# Patient Record
Sex: FEMALE | Race: WHITE | NOT HISPANIC OR LATINO
[De-identification: names, ages, dates, MRNs, and addresses within clinical notes are randomized per-mention and may not be internally consistent; named-entity substitution may affect disease eponyms.]

---

## 2022-05-20 ENCOUNTER — NON-APPOINTMENT (OUTPATIENT)
Age: 72
End: 2022-05-20

## 2022-09-07 PROBLEM — Z00.00 ENCOUNTER FOR PREVENTIVE HEALTH EXAMINATION: Status: ACTIVE | Noted: 2022-09-07

## 2022-09-13 ENCOUNTER — APPOINTMENT (OUTPATIENT)
Dept: PULMONOLOGY | Facility: CLINIC | Age: 72
End: 2022-09-13

## 2022-09-13 VITALS
HEART RATE: 88 BPM | SYSTOLIC BLOOD PRESSURE: 90 MMHG | OXYGEN SATURATION: 96 % | TEMPERATURE: 97.7 F | HEIGHT: 62 IN | BODY MASS INDEX: 23.55 KG/M2 | DIASTOLIC BLOOD PRESSURE: 64 MMHG | WEIGHT: 128 LBS

## 2022-09-13 DIAGNOSIS — Z82.0 FAMILY HISTORY OF EPILEPSY AND OTHER DISEASES OF THE NERVOUS SYSTEM: ICD-10-CM

## 2022-09-13 PROCEDURE — 99204 OFFICE O/P NEW MOD 45 MIN: CPT | Mod: 25

## 2022-09-13 NOTE — PHYSICAL EXAM
[No Acute Distress] : no acute distress [Normal Appearance] : normal appearance [Normal S1, S2] : normal s1, s2 [Clear to Auscultation Bilaterally] : clear to auscultation bilaterally [Benign] : benign [Oriented x3] : oriented x3

## 2022-09-13 NOTE — ASSESSMENT
[FreeTextEntry1] : 71 year old female recently diagnosed ALS presents for evaluation increased wheeze difficulty swallowing, suspect chronic aspiration\par \par Speech and Swallow evaluation\par Nebulized Budesonide and albuterol/Ipratropium twice daily\par PFT with NIKKO testing\par Follow up Neurologyt\par Follow up GI\par  \par Follow up with PFT/NIKKO testing

## 2022-09-13 NOTE — HISTORY OF PRESENT ILLNESS
[Never] : never [TextBox_4] : Patient is a 71 year old female Hx recently diagnosed ALS, dysphagia, chronic aspiration [facility].  Patient has been experiencing increased wheeze, choking with thin liquids.  She is following with Neurology, and is anticipating GI evaluation.  She reports she chokes with water and many foods. She is nonverbal at baseline. She was following in [place] and now lives here in [place] with her son.  She reports cough and shortness of breath at times.  She is here for these symptoms accompanied by her caretaker.

## 2022-09-13 NOTE — REVIEW OF SYSTEMS
[Recent Wt Loss (___ Lbs)] : ~T recent [unfilled] lb weight loss [Cough] : cough [Wheezing] : wheezing [Dysphagia] : dysphagia [Negative] : Endocrine [TextBox_122] : See HPI

## 2022-09-22 ENCOUNTER — APPOINTMENT (OUTPATIENT)
Dept: OTOLARYNGOLOGY | Facility: CLINIC | Age: 72
End: 2022-09-22

## 2022-09-22 PROCEDURE — 92610 EVALUATE SWALLOWING FUNCTION: CPT | Mod: GN

## 2022-09-25 NOTE — ASSESSMENT
[FreeTextEntry1] : CLINICAL DYSPHAGIA EVALUATION\par \par Date of Evaluation: 2022\par \par Patient Name: Sydney Barrett\par Patient : 1950\par Primary Diagnosis: Amyotrophic lateral sclerosis (G12.21)  ; Aspiration, chronic pulmonary (T17.908A)\par Treatment Diagnosis: Oropharyngeal dysphagia (R13.12)\par Referring Physician: Bianca Horn\par Date of Onset: 2021\par \par REASON FOR REFERRAL: Sydney Barrett was seen for an initial Clinical Dysphagia Evaluation at the [facility] ENT office upon the referral of her pulmonologist, Dr. Bianca Horn, due to a diagnosis of ALS, dysphagia and chronic aspiration. This test was ordered to assess oral and pharyngeal stages of swallow function, determine patient safety / tolerance for and oral diet, and/or determine candidacy for further objective swallow testing as needed. \par \par HISTORY OF PRESENTING ILLNESS:  Sydney Barrett was alert and cooperative upon arrival to today's evaluation. She was accompanied by her home health aide. As a result of her dysarthric speech, patient relied on use of a "boogie board" to express herself. Patient reports being diagnosed with ALS in 2021. She reports owning a augmentative and alternative communication device with eye gaze which she uses daily but did not bring to today's evaluation. Every couple of months, the patient follows up with a physician team at [facility] in [place]. Currently, she lives in [place] with her son. Patient denies ever having a swallow evaluation. She is consuming primarily pureed foods and using "Thick-it", according to the HHA. She has been choking. Patient reports not wanting a feeding tube. Patient denies difficulty with self-feeding. \par \par Current Nutritional Intake: Pureed foods and using "Thick-it", as reported by patient's HAA.\par \par Current Respiratory Status: Room air\par \par MEDICAL HISTORY: According to medical charting, as well as documentation provided by patient, PMHx is significant for Amyotrophic lateral sclerosis (ALS), Chronic pulmonary aspiration, Oropharyngeal dysphagia, Hypertension. Medication intake includes Radicava, Riluzole, Hydrochlorothiazide, Lisinopril and Multi VItamin. Patient is also taking medication for oral thrush at the present time. Allergies include Penicillin. \par \par CLINICAL FINDINGS:\par Oroperipheral Examination\par Structures: WFL. +white film on tongue consistent with oral thrush\par Symmetry: WFL\par Secretion Management: Mild pooling of secretions in oral cavity. Intermittent wet vocal quality.\par Mandibular Function: WFL\par Soft Palate: Reduced elevation\par Labial Function: Reduced strength and ROM noted upon pursing and retraction\par Lingual Function: Reduced strength and ROM upon protrusion, elevation and lateralization. Tongue lateralization is especially reduced on R. +fasciculations at rest\par Volitional Cough: Weak\par Volitional Swallow: Delayed, effortful\par \par Motor Speech / Voice: Patient demonstrated slow, labored speech accompanied by impaired articulatory precision, reduced projection and monotone quality. Overall speech intelligibility is judged to be moderately to severely reduced at the single word level.  In addition, wet vocal quality accompanied by weak  throat clearing is intermittently noted throughout today's evaluation.\par \par Consistencies Administered: Pureed; Moderately-Thick Liquid; Mildly-Thick Liquid; Thin Liquids\par \par Oral Phase: The patient demonstrated a moderate to sever oral  dysphagia characterized by weak labial stripping of bolus from utensil, impaired lingual movement with decreased bolus cohesion and prolonged anterior-posterior transport, and suspected premature spillage over base of tongue for pureed, moderately-thick, mildly-thick and thin liquids.\par \par Pharyngeal Stage: The patient demonstrated a moderate to severe pharyngeal dysphagia characterized by delayed swallow trigger with reduced hyolaryngeal elevation/excursion upon digital palpation. There was inconsistent changes in vocal quality to "wet" tone and weak throat clear post deglutition across all textures, suggestive of laryngeal penetration versus aspiration. Increased work of breathing was exhibited post single trial of thin liquid via small cup sip, further suggestive of compromised airway protection. Respiratory pattern was noted to recover after clinician cueing in diaphragmatic breathing. \par \par PRELIMINARY IMPRESSIONS: Moderate to Severe Oropharyngeal Dysphagia\par \par RECOMMENDATIONS:\par 1) Consider Non-Oral Means of Nutrition/Hydration/Medication due to the severity of patient's dysphagia state, as described above.\par 2) Should patient wish to continue oral feeds with the understanding of risks involved (i.e. aspiration, pneumonia, choking), allow Pureed Diet Textures and Moderately-Thick Liquids via Teaspoon Only and with strict adherence to upright position (90 degree angle), slow rate of feeding, small bolus amounts, NO straws.\par 3) Maintain Aspiration Precautions\par 4) A short course of Swallow Therapy (CPT - 63715) is recommended 1x per week, for 4-6 weeks, to maximize swallow function\par 5) Follow up with referring MD for GOC discussion\par \par EDUCATION: The above recommendations were discussed at length with the patient and her health aide. Verbal and written educational information were also provided re: aspiration precautions and safe swallow guidelines. At this time, patient expresses adamant wishes about not wanting a feeding tube. She was advised to follow up with referring MD to discuss GOC. \par \par Should you have additional questions/concerns, please contact this office at [phone].\par \par Jailene Johansen M.S., CCC-SLP

## 2022-09-26 ENCOUNTER — NON-APPOINTMENT (OUTPATIENT)
Age: 72
End: 2022-09-26

## 2022-10-13 ENCOUNTER — APPOINTMENT (OUTPATIENT)
Dept: PULMONOLOGY | Facility: CLINIC | Age: 72
End: 2022-10-13

## 2022-10-13 ENCOUNTER — APPOINTMENT (OUTPATIENT)
Dept: INTERNAL MEDICINE | Facility: CLINIC | Age: 72
End: 2022-10-13

## 2022-10-13 VITALS
WEIGHT: 124 LBS | HEIGHT: 62 IN | TEMPERATURE: 97.6 F | OXYGEN SATURATION: 99 % | HEART RATE: 85 BPM | BODY MASS INDEX: 22.82 KG/M2 | SYSTOLIC BLOOD PRESSURE: 120 MMHG | DIASTOLIC BLOOD PRESSURE: 78 MMHG

## 2022-10-13 PROCEDURE — 94726 PLETHYSMOGRAPHY LUNG VOLUMES: CPT

## 2022-10-13 PROCEDURE — 94010 BREATHING CAPACITY TEST: CPT

## 2022-10-13 PROCEDURE — 99214 OFFICE O/P EST MOD 30 MIN: CPT | Mod: 25

## 2022-10-13 PROCEDURE — 94729 DIFFUSING CAPACITY: CPT

## 2022-10-13 NOTE — PROCEDURE
[FreeTextEntry1] : PFT 10/13/22 personally reviewed normal spirometry, normal lung volumes, normal DLCO significant decrease in PI PE max indicating severe respiratory muscle weakness.

## 2022-10-13 NOTE — ASSESSMENT
[FreeTextEntry1] : 71 year old female recently diagnosed ALS presents for follow up newly diagnosed severe oropharyngeal dysphagia \par \par Continue nebulized Budesonide and albuterol/Ipratropium twice daily\par Follow up Neurology\par Follow up GI\par  \par Discussed at length possibility of respiratory support in future.  Discussed noninvasive respiratory support, risks and contraindications possibility of tracheostomy.  Patient expressed understanding.

## 2022-10-13 NOTE — HISTORY OF PRESENT ILLNESS
[Never] : never [TextBox_4] : Patient is a 71 year old female Hx recently diagnosed ALS, dysphagia, chronic aspiration presents to [facility] ER for follow up.  Patient had swallow evaluation revealing severe severe oral dysphagia and recommended non oral means of nutrition.  Patient  is not amenable to this.  She is now using thick it and eating pureed foods.  She feels she is breathing better. She follow at [facility] for ALS.  She has not undergone sleep evaluation and noninvasive ventilation may be contraindicated in setting of severe dysphagia.  She is here for follow up accompanied by family member.

## 2022-10-24 ENCOUNTER — APPOINTMENT (OUTPATIENT)
Dept: OTOLARYNGOLOGY | Facility: CLINIC | Age: 72
End: 2022-10-24

## 2022-10-24 PROCEDURE — 92526 ORAL FUNCTION THERAPY: CPT | Mod: KX,GN

## 2022-11-16 ENCOUNTER — APPOINTMENT (OUTPATIENT)
Dept: OTOLARYNGOLOGY | Facility: CLINIC | Age: 72
End: 2022-11-16

## 2022-11-16 PROCEDURE — 92526 ORAL FUNCTION THERAPY: CPT | Mod: GN

## 2022-12-19 ENCOUNTER — NON-APPOINTMENT (OUTPATIENT)
Age: 72
End: 2022-12-19

## 2022-12-27 ENCOUNTER — TRANSCRIPTION ENCOUNTER (OUTPATIENT)
Age: 72
End: 2022-12-27

## 2022-12-28 ENCOUNTER — TRANSCRIPTION ENCOUNTER (OUTPATIENT)
Age: 72
End: 2022-12-28

## 2022-12-28 ENCOUNTER — INPATIENT (INPATIENT)
Facility: HOSPITAL | Age: 72
LOS: 1 days | Discharge: NURSING, REHAB OR LONG-TERM CARE | DRG: 481 | End: 2022-12-30
Attending: INTERNAL MEDICINE | Admitting: STUDENT IN AN ORGANIZED HEALTH CARE EDUCATION/TRAINING PROGRAM
Payer: MEDICARE

## 2022-12-28 VITALS
DIASTOLIC BLOOD PRESSURE: 63 MMHG | SYSTOLIC BLOOD PRESSURE: 101 MMHG | HEIGHT: 62 IN | OXYGEN SATURATION: 98 % | WEIGHT: 119.93 LBS | RESPIRATION RATE: 16 BRPM | TEMPERATURE: 98 F | HEART RATE: 95 BPM

## 2022-12-28 DIAGNOSIS — S72.009A FRACTURE OF UNSPECIFIED PART OF NECK OF UNSPECIFIED FEMUR, INITIAL ENCOUNTER FOR CLOSED FRACTURE: ICD-10-CM

## 2022-12-28 LAB
ALBUMIN SERPL ELPH-MCNC: 3.9 G/DL — SIGNIFICANT CHANGE UP (ref 3.3–5)
ALP SERPL-CCNC: 73 U/L — SIGNIFICANT CHANGE UP (ref 40–120)
ALT FLD-CCNC: 44 U/L — SIGNIFICANT CHANGE UP (ref 10–45)
ANION GAP SERPL CALC-SCNC: 7 MMOL/L — SIGNIFICANT CHANGE UP (ref 5–17)
APTT BLD: 31.6 SEC — SIGNIFICANT CHANGE UP (ref 27.5–35.5)
AST SERPL-CCNC: 38 U/L — SIGNIFICANT CHANGE UP (ref 10–40)
BASOPHILS # BLD AUTO: 0.02 K/UL — SIGNIFICANT CHANGE UP (ref 0–0.2)
BASOPHILS NFR BLD AUTO: 0.5 % — SIGNIFICANT CHANGE UP (ref 0–2)
BILIRUB SERPL-MCNC: 0.6 MG/DL — SIGNIFICANT CHANGE UP (ref 0.2–1.2)
BLD GP AB SCN SERPL QL: SIGNIFICANT CHANGE UP
BUN SERPL-MCNC: 12 MG/DL — SIGNIFICANT CHANGE UP (ref 7–23)
CALCIUM SERPL-MCNC: 8.7 MG/DL — SIGNIFICANT CHANGE UP (ref 8.4–10.5)
CHLORIDE SERPL-SCNC: 104 MMOL/L — SIGNIFICANT CHANGE UP (ref 96–108)
CO2 SERPL-SCNC: 29 MMOL/L — SIGNIFICANT CHANGE UP (ref 22–31)
CREAT SERPL-MCNC: 0.61 MG/DL — SIGNIFICANT CHANGE UP (ref 0.5–1.3)
EGFR: 96 ML/MIN/1.73M2 — SIGNIFICANT CHANGE UP
EOSINOPHIL # BLD AUTO: 0.05 K/UL — SIGNIFICANT CHANGE UP (ref 0–0.5)
EOSINOPHIL NFR BLD AUTO: 1.2 % — SIGNIFICANT CHANGE UP (ref 0–6)
FLUAV AG NPH QL: SIGNIFICANT CHANGE UP
FLUBV AG NPH QL: SIGNIFICANT CHANGE UP
GLUCOSE SERPL-MCNC: 99 MG/DL — SIGNIFICANT CHANGE UP (ref 70–99)
HCT VFR BLD CALC: 33 % — LOW (ref 34.5–45)
HGB BLD-MCNC: 10.8 G/DL — LOW (ref 11.5–15.5)
IMM GRANULOCYTES NFR BLD AUTO: 0.2 % — SIGNIFICANT CHANGE UP (ref 0–0.9)
INR BLD: 1.09 RATIO — SIGNIFICANT CHANGE UP (ref 0.88–1.16)
LYMPHOCYTES # BLD AUTO: 1.49 K/UL — SIGNIFICANT CHANGE UP (ref 1–3.3)
LYMPHOCYTES # BLD AUTO: 34.8 % — SIGNIFICANT CHANGE UP (ref 13–44)
MCHC RBC-ENTMCNC: 29.8 PG — SIGNIFICANT CHANGE UP (ref 27–34)
MCHC RBC-ENTMCNC: 32.7 GM/DL — SIGNIFICANT CHANGE UP (ref 32–36)
MCV RBC AUTO: 91.2 FL — SIGNIFICANT CHANGE UP (ref 80–100)
MONOCYTES # BLD AUTO: 0.35 K/UL — SIGNIFICANT CHANGE UP (ref 0–0.9)
MONOCYTES NFR BLD AUTO: 8.2 % — SIGNIFICANT CHANGE UP (ref 2–14)
NEUTROPHILS # BLD AUTO: 2.36 K/UL — SIGNIFICANT CHANGE UP (ref 1.8–7.4)
NEUTROPHILS NFR BLD AUTO: 55.1 % — SIGNIFICANT CHANGE UP (ref 43–77)
NRBC # BLD: 0 /100 WBCS — SIGNIFICANT CHANGE UP (ref 0–0)
PLATELET # BLD AUTO: 179 K/UL — SIGNIFICANT CHANGE UP (ref 150–400)
POTASSIUM SERPL-MCNC: 3.8 MMOL/L — SIGNIFICANT CHANGE UP (ref 3.5–5.3)
POTASSIUM SERPL-SCNC: 3.8 MMOL/L — SIGNIFICANT CHANGE UP (ref 3.5–5.3)
PROT SERPL-MCNC: 6.6 G/DL — SIGNIFICANT CHANGE UP (ref 6–8.3)
PROTHROM AB SERPL-ACNC: 12.6 SEC — SIGNIFICANT CHANGE UP (ref 10.5–13.4)
RBC # BLD: 3.62 M/UL — LOW (ref 3.8–5.2)
RBC # FLD: 13.9 % — SIGNIFICANT CHANGE UP (ref 10.3–14.5)
RSV RNA NPH QL NAA+NON-PROBE: SIGNIFICANT CHANGE UP
SARS-COV-2 RNA SPEC QL NAA+PROBE: SIGNIFICANT CHANGE UP
SODIUM SERPL-SCNC: 140 MMOL/L — SIGNIFICANT CHANGE UP (ref 135–145)
TROPONIN I, HIGH SENSITIVITY RESULT: 6.4 NG/L — SIGNIFICANT CHANGE UP
WBC # BLD: 4.28 K/UL — SIGNIFICANT CHANGE UP (ref 3.8–10.5)
WBC # FLD AUTO: 4.28 K/UL — SIGNIFICANT CHANGE UP (ref 3.8–10.5)

## 2022-12-28 PROCEDURE — 99221 1ST HOSP IP/OBS SF/LOW 40: CPT | Mod: 57

## 2022-12-28 PROCEDURE — 76376 3D RENDER W/INTRP POSTPROCES: CPT | Mod: 26

## 2022-12-28 PROCEDURE — 27235 TREAT THIGH FRACTURE: CPT | Mod: LT

## 2022-12-28 PROCEDURE — 99285 EMERGENCY DEPT VISIT HI MDM: CPT

## 2022-12-28 PROCEDURE — 72192 CT PELVIS W/O DYE: CPT | Mod: 26,MA

## 2022-12-28 PROCEDURE — 93010 ELECTROCARDIOGRAM REPORT: CPT

## 2022-12-28 PROCEDURE — 99223 1ST HOSP IP/OBS HIGH 75: CPT

## 2022-12-28 PROCEDURE — 73552 X-RAY EXAM OF FEMUR 2/>: CPT | Mod: 26,LT

## 2022-12-28 PROCEDURE — ZZZZZ: CPT

## 2022-12-28 PROCEDURE — 73502 X-RAY EXAM HIP UNI 2-3 VIEWS: CPT | Mod: 26,LT

## 2022-12-28 PROCEDURE — 73562 X-RAY EXAM OF KNEE 3: CPT | Mod: 26,LT

## 2022-12-28 PROCEDURE — 71045 X-RAY EXAM CHEST 1 VIEW: CPT | Mod: 26

## 2022-12-28 DEVICE — IMPLANTABLE DEVICE: Type: IMPLANTABLE DEVICE | Site: LEFT | Status: FUNCTIONAL

## 2022-12-28 DEVICE — SCREW CANN 16MM THREAD 7.3X85MM: Type: IMPLANTABLE DEVICE | Site: LEFT | Status: FUNCTIONAL

## 2022-12-28 DEVICE — GWIRE THRD TRC PT 2.8X300MM: Type: IMPLANTABLE DEVICE | Site: LEFT | Status: FUNCTIONAL

## 2022-12-28 DEVICE — SCREW CANN 16MM THREAD 6.5X85MM: Type: IMPLANTABLE DEVICE | Site: LEFT | Status: FUNCTIONAL

## 2022-12-28 DEVICE — SCREW CANN 16MM THREAD 6.5X90MM: Type: IMPLANTABLE DEVICE | Site: LEFT | Status: FUNCTIONAL

## 2022-12-28 RX ORDER — RILUZOLE 50 MG/1
1 TABLET ORAL
Qty: 0 | Refills: 0 | DISCHARGE

## 2022-12-28 RX ORDER — VANCOMYCIN HCL 1 G
750 VIAL (EA) INTRAVENOUS ONCE
Refills: 0 | Status: COMPLETED | OUTPATIENT
Start: 2022-12-29 | End: 2022-12-29

## 2022-12-28 RX ORDER — SODIUM CHLORIDE 9 MG/ML
1000 INJECTION INTRAMUSCULAR; INTRAVENOUS; SUBCUTANEOUS
Refills: 0 | Status: DISCONTINUED | OUTPATIENT
Start: 2022-12-28 | End: 2022-12-30

## 2022-12-28 RX ORDER — MORPHINE SULFATE 50 MG/1
2 CAPSULE, EXTENDED RELEASE ORAL EVERY 4 HOURS
Refills: 0 | Status: DISCONTINUED | OUTPATIENT
Start: 2022-12-28 | End: 2022-12-28

## 2022-12-28 RX ORDER — SODIUM CHLORIDE 9 MG/ML
500 INJECTION INTRAMUSCULAR; INTRAVENOUS; SUBCUTANEOUS ONCE
Refills: 0 | Status: DISCONTINUED | OUTPATIENT
Start: 2022-12-28 | End: 2022-12-28

## 2022-12-28 RX ORDER — RILUZOLE 50 MG/1
50 TABLET ORAL
Refills: 0 | Status: DISCONTINUED | OUTPATIENT
Start: 2022-12-28 | End: 2022-12-30

## 2022-12-28 RX ORDER — OXYCODONE HYDROCHLORIDE 5 MG/1
10 TABLET ORAL
Refills: 0 | Status: DISCONTINUED | OUTPATIENT
Start: 2022-12-28 | End: 2022-12-30

## 2022-12-28 RX ORDER — LANOLIN ALCOHOL/MO/W.PET/CERES
3 CREAM (GRAM) TOPICAL AT BEDTIME
Refills: 0 | Status: DISCONTINUED | OUTPATIENT
Start: 2022-12-28 | End: 2022-12-28

## 2022-12-28 RX ORDER — LANOLIN ALCOHOL/MO/W.PET/CERES
3 CREAM (GRAM) TOPICAL AT BEDTIME
Refills: 0 | Status: DISCONTINUED | OUTPATIENT
Start: 2022-12-28 | End: 2022-12-30

## 2022-12-28 RX ORDER — HYDROMORPHONE HYDROCHLORIDE 2 MG/ML
0.5 INJECTION INTRAMUSCULAR; INTRAVENOUS; SUBCUTANEOUS
Refills: 0 | Status: DISCONTINUED | OUTPATIENT
Start: 2022-12-28 | End: 2022-12-30

## 2022-12-28 RX ORDER — HYDROMORPHONE HYDROCHLORIDE 2 MG/ML
1 INJECTION INTRAMUSCULAR; INTRAVENOUS; SUBCUTANEOUS
Refills: 0 | Status: DISCONTINUED | OUTPATIENT
Start: 2022-12-28 | End: 2022-12-28

## 2022-12-28 RX ORDER — ACETAMINOPHEN 500 MG
650 TABLET ORAL EVERY 6 HOURS
Refills: 0 | Status: DISCONTINUED | OUTPATIENT
Start: 2022-12-28 | End: 2022-12-28

## 2022-12-28 RX ORDER — SODIUM CHLORIDE 9 MG/ML
1000 INJECTION INTRAMUSCULAR; INTRAVENOUS; SUBCUTANEOUS ONCE
Refills: 0 | Status: COMPLETED | OUTPATIENT
Start: 2022-12-28 | End: 2022-12-28

## 2022-12-28 RX ORDER — ONDANSETRON 8 MG/1
4 TABLET, FILM COATED ORAL ONCE
Refills: 0 | Status: DISCONTINUED | OUTPATIENT
Start: 2022-12-28 | End: 2022-12-28

## 2022-12-28 RX ORDER — EDARAVONE 105 MG/5ML
0 KIT ORAL
Qty: 0 | Refills: 0 | DISCHARGE

## 2022-12-28 RX ORDER — HYDROMORPHONE HYDROCHLORIDE 2 MG/ML
0.5 INJECTION INTRAMUSCULAR; INTRAVENOUS; SUBCUTANEOUS
Refills: 0 | Status: DISCONTINUED | OUTPATIENT
Start: 2022-12-28 | End: 2022-12-28

## 2022-12-28 RX ORDER — RILUZOLE 50 MG/1
0 TABLET ORAL
Qty: 0 | Refills: 0 | DISCHARGE

## 2022-12-28 RX ORDER — SODIUM CHLORIDE 9 MG/ML
1000 INJECTION, SOLUTION INTRAVENOUS
Refills: 0 | Status: DISCONTINUED | OUTPATIENT
Start: 2022-12-28 | End: 2022-12-28

## 2022-12-28 RX ORDER — MORPHINE SULFATE 50 MG/1
4 CAPSULE, EXTENDED RELEASE ORAL ONCE
Refills: 0 | Status: DISCONTINUED | OUTPATIENT
Start: 2022-12-28 | End: 2022-12-28

## 2022-12-28 RX ORDER — OXYCODONE HYDROCHLORIDE 5 MG/1
5 TABLET ORAL
Refills: 0 | Status: DISCONTINUED | OUTPATIENT
Start: 2022-12-28 | End: 2022-12-30

## 2022-12-28 RX ORDER — ONDANSETRON 8 MG/1
4 TABLET, FILM COATED ORAL EVERY 8 HOURS
Refills: 0 | Status: DISCONTINUED | OUTPATIENT
Start: 2022-12-28 | End: 2022-12-30

## 2022-12-28 RX ORDER — ENOXAPARIN SODIUM 100 MG/ML
40 INJECTION SUBCUTANEOUS EVERY 24 HOURS
Refills: 0 | Status: DISCONTINUED | OUTPATIENT
Start: 2022-12-29 | End: 2022-12-30

## 2022-12-28 RX ORDER — ACETAMINOPHEN 500 MG
650 TABLET ORAL EVERY 6 HOURS
Refills: 0 | Status: DISCONTINUED | OUTPATIENT
Start: 2022-12-28 | End: 2022-12-30

## 2022-12-28 RX ORDER — MORPHINE SULFATE 50 MG/1
1 CAPSULE, EXTENDED RELEASE ORAL EVERY 4 HOURS
Refills: 0 | Status: DISCONTINUED | OUTPATIENT
Start: 2022-12-28 | End: 2022-12-28

## 2022-12-28 RX ADMIN — RILUZOLE 50 MILLIGRAM(S): 50 TABLET ORAL at 23:15

## 2022-12-28 RX ADMIN — SODIUM CHLORIDE 1000 MILLILITER(S): 9 INJECTION INTRAMUSCULAR; INTRAVENOUS; SUBCUTANEOUS at 11:48

## 2022-12-28 RX ADMIN — MORPHINE SULFATE 4 MILLIGRAM(S): 50 CAPSULE, EXTENDED RELEASE ORAL at 11:48

## 2022-12-28 RX ADMIN — SODIUM CHLORIDE 75 MILLILITER(S): 9 INJECTION INTRAMUSCULAR; INTRAVENOUS; SUBCUTANEOUS at 23:19

## 2022-12-28 RX ADMIN — MORPHINE SULFATE 4 MILLIGRAM(S): 50 CAPSULE, EXTENDED RELEASE ORAL at 12:18

## 2022-12-28 RX ADMIN — SODIUM CHLORIDE 1000 MILLILITER(S): 9 INJECTION INTRAMUSCULAR; INTRAVENOUS; SUBCUTANEOUS at 12:48

## 2022-12-28 NOTE — ED ADULT NURSE NOTE - CHIEF COMPLAINT QUOTE
Mom fell yesterday at around 530 pm and has pain in her hip and thigh. Unable to weight bear after Fall. History of ALS. done/Annika

## 2022-12-28 NOTE — ED PROVIDER NOTE - PHYSICAL EXAMINATION
VITAL SIGNS: I have reviewed nursing notes and confirm.  CONSTITUTIONAL: well-appearing, non-toxic, NAD  SKIN: Warm dry, normal skin turgor  HEAD: NCAT  EYES:  no scleral icterus  ENT: Moist mucous membranes, normal pharynx   NECK: Supple; non tender. Full ROM.   CARD: RRR, no murmurs, rubs or gallops  RESP: clear to ausculation b/l.  No rales, rhonchi, or wheezing.  ABD: soft, + BS, non-tender, non-distended, no rebound or guarding. No CVA tenderness  EXT: L hip ttp, neurovascularly intact   NEURO: normal motor. normal sensory. CN II-XII intact.  PSYCH: Cooperative, appropriate.

## 2022-12-28 NOTE — ED PROVIDER NOTE - CLINICAL SUMMARY MEDICAL DECISION MAKING FREE TEXT BOX
72-year-old female with history of ALS, hyperlipidemia not on anticoagulation presenting to the ED status post fall complaining of isolated left hip pain now unable to ambulate will obtain x-ray of left hip femur and knee, chest x-ray EKG cardiac enzymes screening labs, rule out fracture and consult orthopedics as necessary pending imaging 72-year-old female with history of ALS, hyperlipidemia not on anticoagulation presenting to the ED status post fall complaining of isolated left hip pain now unable to ambulate will obtain x-ray of left hip femur and knee, chest x-ray EKG cardiac enzymes screening labs, rule out fracture and consult orthopedics as necessary pending imaging    acute femoral neck fracture   Dr. Ernst aware   admit to medicine

## 2022-12-28 NOTE — ED ADULT NURSE NOTE - OBJECTIVE STATEMENT
Pt came from home, accompanied by son s/p fall yesterday evening. Pts son states that around 5:30 pm yesterday, pt tripped and fell on to carpet in her home. Pt denies head injury or LOC. Pt denies taking blood thinners. Pt with hx ALS and high cholesterol. Pt able to ambulate at baseline, but now with c/o left hip/ thigh pain. Pt rates left hip/thigh pain 7/10 and is unable to bear weight on LLE at this time. Pt came from home, accompanied by son s/p fall yesterday evening. Pts son states that around 5:30 pm yesterday, pt tripped and fell on to carpet in her home- pts son is an EMT and states that he believes it is vasovagal in nature because pt has not been drinking enough water. Pt denies head injury or LOC. Pt denies taking blood thinners. Pt with hx ALS and high cholesterol. Pt able to ambulate at baseline, but now with c/o left hip/ thigh pain. Pt rates left hip/thigh pain 7/10 and is unable to bear weight on LLE at this time. Pts son at bedside states that he gave pt 4 mg morphine 2hrs PTA.

## 2022-12-28 NOTE — H&P ADULT - NSHPSOCIALHISTORY_GEN_ALL_CORE
Lives alone, ambulates independently; able to use stairs  Has 2 aides 18 hours per day  Denies smoking, alcohol use, illicit drug use      Family hx: denies family hx of cancer   Mother  age 91 hx of HTN  Father  age 92 hx of HTN, DM  Brother with hx of ALS

## 2022-12-28 NOTE — H&P ADULT - HISTORY OF PRESENT ILLNESS
72F with PMH ALS, HLD presents to the ED after fall on her left side. Denies head trauma. Per son at bedside, who is an EMT, felt patient was dehydrated and vasovagal resulting in fall. Reports patient was unable to ambulate and had to get up with assistance. Patient reports sharp left sided hip pain prompting ED evaluation. Son gave 4mg IV Morphine prior to arrival.     In the ED: temp 97.7, /63, HR 95, RR 16, Spo2 98% RA  H/H: 10.8/33  Left hip xray personally reviewed femor fracture. CT hip ordered.   EKG personally reviewed NSR 75  Received 1000mL NS bolus, 4mg IV Morphine in the ED.  72F with PMH ALS (minimally verbal at baseline, uses touch pad to respond), JAQUELINE presents to the ED after fall on her left side at 5:30pm last night. History obtained by son Chavez who is an EMT at bedside. Patient reports she was feeding the dog bent down to place the food and when she got up felt dizzy and fell.  Denies head trauma. Patient crawled to the living room and waiting 3 hours for her PM aide to come. Aide called her son who thought fall was due to vasovagal as patient does not stay hydrated due to thickened fluid diet. Patient declined hospital eval yesterday evening, so son gave her PO Morphine and Ativan for pain. States patient did not have bruising around hip but admits to swelling in the left hip. Patient woke up this morning with persistent left hip pain, rated 5/10 received PO Morphine 4mg and Ativan 0.5mg and came to the ED.  Reports patient was unable to ambulate.   Of note patient has J tube placement scheduled on 1/9 with GI Dr Lozano.     In the ED: temp 97.7, /63, HR 95, RR 16, Spo2 98% RA  H/H: 10.8/33  Left hip x-ray personally reviewed femor fracture. CT hip ordered.   EKG personally reviewed NSR 75  Received 1000mL NS bolus, 4mg IV Morphine in the ED.

## 2022-12-28 NOTE — ED ADULT TRIAGE NOTE - CHIEF COMPLAINT QUOTE
Mom fell yesterday at around 530 pm and has pain in her hip and thigh. Unable to weight bear after Fall. History of ALS.

## 2022-12-28 NOTE — H&P ADULT - ASSESSMENT
72F with PMH ALS, HLD presents to the ED after fall on her left side admitted for acute left femoral fracture.     #Left femoral fracture      #Anemia    #HLD    #ALS      #Prophylactic measure 72F with PMH ALS, HLD presents to the ED after fall on her left side admitted for acute left femoral fracture.     #Left femoral fracture  -Admit to medicine  -left hip x-ray with fracture, follow up CT hip  -NPO except medication   -Gentle IVF while NPO  -Pain control with Morphine PRN for moderate / severe pain  -Ortho Dr Ernst consulted in the ED    #Anemia  Likely anemia of chronic disease v due to fracture ?  -Monitor H/H  -Type and screen performed  -Follow up AM CBC    #HLD  -Not on medication per son   -Supportive care    #ALS  -Takes Radicava and Riluzole at home, son to bring in medication  -Plan for J tube placement with GI outpatient 1/9. Patient on puree with thickened liquids at baseline plan to resume once off NPO pending possible OR  -Supportive care    #Prophylactic measure  DVT ppx: hold chemical ppx pending ortho consult and possible OR    Discussed with son Chavez at bedside, aware and in agreement  with above.  72F with PMH ALS, HLD presents to the ED after fall on her left side admitted for acute left femoral fracture.     #Left femoral fracture  -Admit to medicine  -left hip x-ray with fracture, follow up CT hip  -NPO except medication   -Gentle IVF while NPO  -Pain control with Morphine PRN for moderate / severe pain  -Discussed with ortho Dr Ernst, plan for OR today  -Currently no active cardiac conditions. No signs of ischemia, ADHF, no unstable arrhythmias noted. Therefore, able to proceed with low risk surgery. Routine hemodynamic monitoring is suggested. HR and BP acceptable    #Anemia  Likely anemia of chronic disease v due to fracture ?  -Monitor H/H  -Type and screen performed  -Follow up AM CBC    #HLD  -Not on medication per son   -Supportive care    #ALS  -Takes Radicava and Riluzole at home, son to bring in medication  -Plan for J tube placement with GI outpatient 1/9. Patient on puree with thickened liquids at baseline plan to resume once off NPO pending OR  -Supportive care    #Prophylactic measure  DVT ppx: hold chemical ppx pending OR    Discussed with son Chavez at bedside, aware and in agreement  with above.

## 2022-12-28 NOTE — H&P ADULT - NSHPPHYSICALEXAM_GEN_ALL_CORE
VITALS:   T(C): 36.5 (12-28-22 @ 11:03), Max: 36.5 (12-28-22 @ 11:03)  HR: 95 (12-28-22 @ 11:03) (95 - 95)  BP: 101/63 (12-28-22 @ 11:03) (101/63 - 101/63)  RR: 16 (12-28-22 @ 11:03) (16 - 16)  SpO2: 98% (12-28-22 @ 11:03) (98% - 98%)    GENERAL: NAD  HEENT:  AT/NC, anicteric, dry mucous membranes, EOMI, PERRL, no lid-lag, conjunctiva and sclera clear  CHEST/LUNG:  CTA b/l, no rales, wheezes, or rhonchi,  normal respiratory effort, no intercostal retractions  HEART:  RRR, S1, S2, no murmurs; no pitting edema  ABDOMEN:  BS+, soft, nontender, nondistended  MSK/EXTREMITIES: palpable peripheral pulses, mild L hip swelling; tenderness to palpation around left hip, no ecchymosis noted  NERVOUS SYSTEM: answers questions and follows commands appropriately A&Ox3, limited exam of LLE; grossly moves RLE, RUE, LUE; minimally verbal responds via writing on touch pad   SKIN: No obvious rashes or lesions  PSYCH: Appropriate affect, Alert & Awake; fair judgement

## 2022-12-28 NOTE — ED ADULT NURSE NOTE - HOW OFTEN DO YOU HAVE A DRINK CONTAINING ALCOHOL?
Dear Brett Vizcarra MD,     We are contacting you regarding your patient's eligibility for a disease management program for diabetes.     The Metformin Dose Optimization program:  · Is a 12 week telephonic program with a clinical pharmacist  · Helps patients maximize their metformin therapy, improve glycemic control, and avoid the need for additional diabetes medications  · Includes counseling on adherence, lifestyle, and self-management  · Utilizes a dose titration protocol to increase metformin to target doses as tolerated  · Enhances in-between visit care at no additional cost to your patient      To enroll Sai in this program, simply sign the pended \"Service to Pharmacist Medication Management\" referral order.     His current metformin dose is: 1000 mg daily with breakfast  His most recent A1c was   Hemoglobin A1C (%)   Date Value   09/20/2019 8.9 (H)   .     We will contact Sai to initiate the program. As part of this program, a letter will be sent to the patient on your behalf. All patient outreach will be documented in Epic and can be accessed by you or your staff at any time. We will check in with you again once the program is complete.     Please feel free to contact us with any questions or concerns.     Thank you,     Brandy Riojas, PharmD  Population Health Clinical Pharmacist  [email or web address]  [phone]        Never

## 2022-12-28 NOTE — H&P ADULT - NSHPREVIEWOFSYSTEMS_GEN_ALL_CORE
CONSTITUTIONAL: No fever, No fatigue  EYES: No eye pain or discharge  ENMT:   No ear pain; No sinus or throat pain  NECK: No pain or stiffness  RESPIRATORY: No cough, wheezing; No shortness of breath  CARDIOVASCULAR: No chest pain, palpitations, or leg swelling  GASTROINTESTINAL: No abdominal pain. No nausea, vomiting; No diarrhea or constipation.  GENITOURINARY: No dysuria, frequency  NEUROLOGICAL: No headaches, loss of strength, No dizziness  SKIN: No itching, burning  ENDOCRINE: No heat or cold intolerance; No hair loss  MUSCULOSKELETAL: left hip pain, mild swelling of hip  PSYCHIATRIC: No depression, anxiety  HEME/LYMPH: No easy bruising, or bleeding gums  ALLERGY AND IMMUNOLOGIC: No hives or eczema

## 2022-12-28 NOTE — ED PROVIDER NOTE - NS ED ROS FT
Constitutional: See HPI.  Eyes: No visual changes, eye pain or discharge. No Photophobia  ENMT: No hearing changes, pain, discharge or infections. No neck pain or stiffness. No limited ROM  Cardiac: No SOB or edema. No chest pain with exertion.  Respiratory: No cough or respiratory distress.   GI: No nausea, vomiting, diarrhea or abdominal pain.  : No dysuria, frequency or burning. No Discharge  MS: see hpi   Neuro: see hpi   Skin: No skin rash.  Except as documented in the HPI, all other systems are negative.

## 2022-12-28 NOTE — ED ADULT NURSE NOTE - NSIMPLEMENTINTERV_GEN_ALL_ED
Implemented All Fall with Harm Risk Interventions:  [place] to call system. Call bell, personal items and telephone within reach. Instruct patient to call for assistance. Room bathroom lighting operational. Non-slip footwear when patient is off stretcher. Physically safe environment: no spills, clutter or unnecessary equipment. Stretcher in lowest position, wheels locked, appropriate side rails in place. Provide visual cue, wrist band, yellow gown, etc. Monitor gait and stability. Monitor for mental status changes and reorient to person, place, and time. Review medications for side effects contributing to fall risk. Reinforce activity limits and safety measures with patient and family. Provide visual clues: red socks. 2

## 2022-12-28 NOTE — BRIEF OPERATIVE NOTE - NSICDXBRIEFPROCEDURE_GEN_ALL_CORE_FT
PROCEDURES:  Closed reduction and percutaneous pinning (CRPP) of right hip 28-Dec-2022 20:40:10  Nadeem Fuentes

## 2022-12-28 NOTE — ED PROVIDER NOTE - OBJECTIVE STATEMENT
72-year-old female with history of ALS, hyperlipidemia presenting to the ED status post fall yesterday around 5:30 PM per son who is an EMT, reports that patient has not been hydrating well feels as though she vasovagal.  Did not hit her head, denied any chest pain shortness of breath neck pain or back pain complained of isolated left hip pain worse with range of motion.  Patient has not been able to ambulate status post fall does not use any assistive devices to walk.  Son gave her 4 mg of morphine 2 hours prior to arrival.

## 2022-12-29 ENCOUNTER — TRANSCRIPTION ENCOUNTER (OUTPATIENT)
Age: 72
End: 2022-12-29

## 2022-12-29 LAB
ANION GAP SERPL CALC-SCNC: 8 MMOL/L — SIGNIFICANT CHANGE UP (ref 5–17)
BUN SERPL-MCNC: 11 MG/DL — SIGNIFICANT CHANGE UP (ref 7–23)
CALCIUM SERPL-MCNC: 8.8 MG/DL — SIGNIFICANT CHANGE UP (ref 8.4–10.5)
CHLORIDE SERPL-SCNC: 104 MMOL/L — SIGNIFICANT CHANGE UP (ref 96–108)
CO2 SERPL-SCNC: 26 MMOL/L — SIGNIFICANT CHANGE UP (ref 22–31)
CREAT SERPL-MCNC: 0.52 MG/DL — SIGNIFICANT CHANGE UP (ref 0.5–1.3)
EGFR: 100 ML/MIN/1.73M2 — SIGNIFICANT CHANGE UP
GLUCOSE SERPL-MCNC: 169 MG/DL — HIGH (ref 70–99)
HCT VFR BLD CALC: 28.9 % — LOW (ref 34.5–45)
HCV AB S/CO SERPL IA: 0.06 S/CO — SIGNIFICANT CHANGE UP (ref 0–0.99)
HCV AB SERPL-IMP: SIGNIFICANT CHANGE UP
HGB BLD-MCNC: 9.6 G/DL — LOW (ref 11.5–15.5)
MCHC RBC-ENTMCNC: 29.7 PG — SIGNIFICANT CHANGE UP (ref 27–34)
MCHC RBC-ENTMCNC: 33.2 GM/DL — SIGNIFICANT CHANGE UP (ref 32–36)
MCV RBC AUTO: 89.5 FL — SIGNIFICANT CHANGE UP (ref 80–100)
NRBC # BLD: 0 /100 WBCS — SIGNIFICANT CHANGE UP (ref 0–0)
PLATELET # BLD AUTO: 154 K/UL — SIGNIFICANT CHANGE UP (ref 150–400)
POTASSIUM SERPL-MCNC: 4.6 MMOL/L — SIGNIFICANT CHANGE UP (ref 3.5–5.3)
POTASSIUM SERPL-SCNC: 4.6 MMOL/L — SIGNIFICANT CHANGE UP (ref 3.5–5.3)
RBC # BLD: 3.23 M/UL — LOW (ref 3.8–5.2)
RBC # FLD: 13.7 % — SIGNIFICANT CHANGE UP (ref 10.3–14.5)
SODIUM SERPL-SCNC: 138 MMOL/L — SIGNIFICANT CHANGE UP (ref 135–145)
WBC # BLD: 4.77 K/UL — SIGNIFICANT CHANGE UP (ref 3.8–10.5)
WBC # FLD AUTO: 4.77 K/UL — SIGNIFICANT CHANGE UP (ref 3.8–10.5)

## 2022-12-29 PROCEDURE — 99233 SBSQ HOSP IP/OBS HIGH 50: CPT

## 2022-12-29 PROCEDURE — 99223 1ST HOSP IP/OBS HIGH 75: CPT | Mod: FS

## 2022-12-29 RX ORDER — SENNA PLUS 8.6 MG/1
2 TABLET ORAL AT BEDTIME
Refills: 0 | Status: DISCONTINUED | OUTPATIENT
Start: 2022-12-29 | End: 2022-12-30

## 2022-12-29 RX ORDER — POLYETHYLENE GLYCOL 3350 17 G/17G
17 POWDER, FOR SOLUTION ORAL DAILY
Refills: 0 | Status: DISCONTINUED | OUTPATIENT
Start: 2022-12-29 | End: 2022-12-30

## 2022-12-29 RX ORDER — PANTOPRAZOLE SODIUM 20 MG/1
40 TABLET, DELAYED RELEASE ORAL
Refills: 0 | Status: DISCONTINUED | OUTPATIENT
Start: 2022-12-29 | End: 2022-12-30

## 2022-12-29 RX ADMIN — Medication 650 MILLIGRAM(S): at 21:10

## 2022-12-29 RX ADMIN — RILUZOLE 50 MILLIGRAM(S): 50 TABLET ORAL at 17:12

## 2022-12-29 RX ADMIN — Medication 250 MILLIGRAM(S): at 06:01

## 2022-12-29 RX ADMIN — RILUZOLE 50 MILLIGRAM(S): 50 TABLET ORAL at 05:13

## 2022-12-29 RX ADMIN — Medication 650 MILLIGRAM(S): at 09:39

## 2022-12-29 RX ADMIN — Medication 650 MILLIGRAM(S): at 10:09

## 2022-12-29 RX ADMIN — Medication 3 MILLIGRAM(S): at 21:09

## 2022-12-29 RX ADMIN — OXYCODONE HYDROCHLORIDE 5 MILLIGRAM(S): 5 TABLET ORAL at 13:48

## 2022-12-29 RX ADMIN — SENNA PLUS 2 TABLET(S): 8.6 TABLET ORAL at 21:09

## 2022-12-29 RX ADMIN — OXYCODONE HYDROCHLORIDE 5 MILLIGRAM(S): 5 TABLET ORAL at 14:18

## 2022-12-29 RX ADMIN — ENOXAPARIN SODIUM 40 MILLIGRAM(S): 100 INJECTION SUBCUTANEOUS at 05:13

## 2022-12-29 NOTE — DISCHARGE NOTE PROVIDER - HOSPITAL COURSE
72F with PMH ALS (minimally verbal at baseline, uses touch pad to respond), JAQUELINE presents to the ED after fall on her left side at 5:30pm last night. History obtained by son Chavez who is an EMT at bedside. Patient reports she was feeding the dog bent down to place the food and when she got up felt dizzy and fell.  Denies head trauma. Patient crawled to the living room and waiting 3 hours for her PM aide to come. Aide called her son who thought fall was due to vasovagal as patient does not stay hydrated due to thickened fluid diet. Patient declined hospital eval yesterday evening, so son gave her PO Morphine and Ativan for pain. States patient did not have bruising around hip but admits to swelling in the left hip. Patient woke up this morning with persistent left hip pain, rated 5/10 received PO Morphine 4mg and Ativan 0.5mg and came to the ED.  Reports patient was unable to ambulate. Of note patient has J tube placement scheduled on 1/9 with GI Dr Lozano. In the ED: temp 97.7, /63, HR 95, RR 16, Spo2 98% RA, H/H: 10.8/33, Left hip x-ray personally reviewed femor fracture. CT hip ordered.   EKG personally reviewed NSR 75, Received 1000mL NS bolus, 4mg IV Morphine in the ED. Ct pelvis showed Impacted left subcapital fracture. s/p Closed reduction and percutaneous pinning (CRPP) of right hip on 12/28. sen by PT    Source of Infection:  Antibiotic / Last Day: none    Palliative Care / Advanced Care Planning  Code Status: full    Discharging Provider:  Tamara Bardales MD  Contact Info: Cell [phone] - Please call with any questions or concerns.    Outpatient Provider:     Signout given to  SNF Provider:         72F with PMH ALS (minimally verbal at baseline, uses touch pad to respond), JAQUELINE presents to the ED after fall on her left side at 5:30pm last night. History obtained by son Chavez who is an EMT at bedside. Patient reports she was feeding the dog bent down to place the food and when she got up felt dizzy and fell.  Denies head trauma. Patient crawled to the living room and waiting 3 hours for her PM aide to come. Aide called her son who thought fall was due to vasovagal as patient does not stay hydrated due to thickened fluid diet. Patient declined hospital eval yesterday evening, so son gave her PO Morphine and Ativan for pain. States patient did not have bruising around hip but admits to swelling in the left hip. Patient woke up this morning with persistent left hip pain, rated 5/10 received PO Morphine 4mg and Ativan 0.5mg and came to the ED.  Reports patient was unable to ambulate. Of note patient has J tube placement scheduled on 1/9 with GI Dr Lozano. In the ED: temp 97.7, /63, HR 95, RR 16, Spo2 98% RA, H/H: 10.8/33, Left hip x-ray personally reviewed femor fracture. CT hip ordered.   EKG personally reviewed NSR 75, Received 1000mL NS bolus, 4mg IV Morphine in the ED. Ct pelvis showed Impacted left subcapital fracture. s/p Closed reduction and percutaneous pinning (CRPP) of right hip on 12/28. seen by PT/OT/PM&R. PT suggested acute     Source of Infection:  Antibiotic / Last Day: none    Palliative Care / Advanced Care Planning  Code Status: full    Discharging Provider:  Tamara Bardales MD  Contact Info: Cell [phone] - Please call with any questions or concerns.    Outpatient Provider:     Signout given to  SNF Provider:         72F with PMH ALS (minimally verbal at baseline, uses touch pad to respond), JAQUELINE presents to the ED after fall on her left side at 5:30pm last night. History obtained by son Chavez who is an EMT at bedside. Patient reports she was feeding the dog bent down to place the food and when she got up felt dizzy and fell.  Denies head trauma. Patient crawled to the living room and waiting 3 hours for her PM aide to come. Aide called her son who thought fall was due to vasovagal as patient does not stay hydrated due to thickened fluid diet. Patient declined hospital eval yesterday evening, so son gave her PO Morphine and Ativan for pain. States patient did not have bruising around hip but admits to swelling in the left hip. Patient woke up this morning with persistent left hip pain, rated 5/10 received PO Morphine 4mg and Ativan 0.5mg and came to the ED.  Reports patient was unable to ambulate. Of note patient has J tube placement scheduled on 1/9 with GI Dr Lozano. In the ED: temp 97.7, /63, HR 95, RR 16, Spo2 98% RA, H/H: 10.8/33, Left hip x-ray personally reviewed femor fracture. CT hip ordered.   EKG personally reviewed NSR 75, Received 1000mL NS bolus, 4mg IV Morphine in the ED. Ct pelvis showed Impacted left subcapital fracture. s/p Closed reduction and percutaneous pinning (CRPP) of right hip on 12/28. seen by PT/OT/PM&R. PT suggested acute rehab. but seen by PM&R. suggested [facility]. patient is medically stable for DC to [facility] today    Source of Infection:  Antibiotic / Last Day: none    Palliative Care / Advanced Care Planning  Code Status: full    Discharging Provider:  Tamara Bardales MD  Contact Info: Cell [phone] - Please call with any questions or concerns.    Outpatient Provider:     Signout given to  SNF Provider: Dr. Greer [ informed]

## 2022-12-29 NOTE — PATIENT PROFILE ADULT - OVER THE PAST TWO WEEKS HAVE YOU FELT DOWN, DEPRESSED OR HOPELESS?
Female Completion Statement: After discussing her treatment course we decided to discontinue isotretinoin therapy at this time. I explained that she would need to continue her birth control methods for at least one month after the last dosage. She should also get a pregnancy test one month after the last dose. She shouldn't donate blood for one month after the last dose. She should call with any new symptoms of depression. Patient Weight (Optional But Required For Cumulative Dose-Numbers And Decimals Only): 183 Male Completion Statement: After discussing his treatment course we decided to discontinue isotretinoin therapy at this time. He shouldn't donate blood for one month after the last dose. He should call with any new symptoms of depression. Completed Therapy?: No Ipledge Number (Optional): [id number] Are Labs Available For Review?: Yes Pounds Preamble Statement (Weight Entered In Details Tab): Reported Weight in pounds: Months Of Therapy Completed: 4 Dosing Month 2 (Required For Cumulative Dosing): 40mg BID Weight Units: pounds Detail Level: Zone Kilograms Preamble Statement (Weight Entered In Details Tab): Reported Weight in kilograms: no

## 2022-12-29 NOTE — OCCUPATIONAL THERAPY INITIAL EVALUATION ADULT - ADL RETRAINING, OT EVAL
Patient will perform self-feeding with s/u-supervision assistance using necessary AE prn within 3-5 treatment sessions. Patient will perform oral hygiene with s/u-supervision assistance using necessary AE prn within 3-5 treatment sessions

## 2022-12-29 NOTE — DISCHARGE NOTE PROVIDER - NSDCCPCAREPLAN_GEN_ALL_CORE_FT
PRINCIPAL DISCHARGE DIAGNOSIS  Diagnosis: Femoral neck fracture  Assessment and Plan of Treatment: you were found to have fracture hip. fized with pinning. continue physical therapy. fall precautions. see orthopedic in 2 weeks       PRINCIPAL DISCHARGE DIAGNOSIS  Diagnosis: Femoral neck fracture  Assessment and Plan of Treatment: you were found to have fracture hip. fixed with pinning. continue physical therapy. fall precautions. see orthopedic in 2 weeks. we are givign you blood tinner to prevent blood clot. watch for blood in urine/stool/black stool. fall precautions as fall can case bran bleed and fracture bone. seek immediate medical attention if leg pain, swelling of legs, chest pain, shortness of breath or any other concerning symptoms. see primary MD in 1 week of discharge       PRINCIPAL DISCHARGE DIAGNOSIS  Diagnosis: Femoral neck fracture  Assessment and Plan of Treatment: you were found to have fracture hip. fixed with pinning. continue physical therapy. fall precautions. see orthopedic in 2 weeks. we are givign you blood tinner to prevent blood clot. watch for blood in urine/stool/black stool. fall precautions as fall can case bran bleed and fracture bone. seek immediate medical attention if leg pain, swelling of legs, chest pain, shortness of breath or any other concerning symptoms. see primary MD in 1 week of discharge. continue lovenox for 21 days      SECONDARY DISCHARGE DIAGNOSES  Diagnosis: ALS (amyotrophic lateral sclerosis)  Assessment and Plan of Treatment: medications verified with pharmacy. on Riluzone BID and ON Radicava 105 mg/5 mls sol, take 5 mls daily 10 out of 14 days then next 14 days off. per son blanca, next cycle to start is Jan 5, 2023. follow up nurology outpatient.    Diagnosis: Dysphagia  Assessment and Plan of Treatment: seen by speech swallow specialist. passed swallow eval. on Pureed moderately thick liquid. tolerating well. needs feeding with assisstance. aspiration precautions. patient has been shceduled for G-tube placement on Jan 9 OP.

## 2022-12-29 NOTE — DIETITIAN INITIAL EVALUATION ADULT - PERTINENT LABORATORY DATA
12-29    138  |  104  |  11  ----------------------------<  169<H>  4.6   |  26  |  0.52    Ca    8.8      29 Dec 2022 07:34    TPro  6.6  /  Alb  3.9  /  TBili  0.6  /  DBili  x   /  AST  38  /  ALT  44  /  AlkPhos  73  12-28

## 2022-12-29 NOTE — OCCUPATIONAL THERAPY INITIAL EVALUATION ADULT - NSACTIVITYREC_GEN_A_OT
Pt presents with impaired balance, FMC, endurance and muscle strength that will benefit from skilled OT to improve independence in ADLs, reduce fall risk and chance for readmission.

## 2022-12-29 NOTE — DISCHARGE NOTE PROVIDER - NSDCFUSCHEDAPPT_GEN_ALL_CORE_FT
Bianca Horn  [facility]  PULED [address] [facility]  Scheduled Appointment: 02/16/2023     [facility]  [facility]  Scheduled Appointment: 01/09/2023    Bianca Horn  [facility]  [facility] [address] [facility]  Scheduled Appointment: 02/16/2023     Shay Ernst  [facility]  ORTHOSURG [address] [facility]  Scheduled Appointment: 01/11/2023    Bianca Horn  [facility]  PULMMED [address] [facility]  Scheduled Appointment: 02/16/2023

## 2022-12-29 NOTE — PHYSICAL THERAPY INITIAL EVALUATION ADULT - GENERAL OBSERVATIONS, REHAB EVAL
pt in bed, no c/c, a+ox3, alternative commication device 2/2 dysarthria, vss per tele,+IV, +prima fit

## 2022-12-29 NOTE — OCCUPATIONAL THERAPY INITIAL EVALUATION ADULT - BALANCE TRAINING, PT EVAL
Pt will demonstrate improved static/dynamic balance by 1/2 grade in order to increase safety and independence with functional transfers within 4 weeks

## 2022-12-29 NOTE — CONSULT NOTE ADULT - SUBJECTIVE AND OBJECTIVE BOX
Patient is a 72y old  Female who presents with a chief complaint of left femoral neck fracture (29 Dec 2022 11:04)      HPI:  This is a 73 YO Female with PMH  of ALS (minimally verbal at baseline, uses touch pad to respond), HLD presents to  [facility] ED on 12/28 after fall on her left side at 5:30pm the night prior. History was obtained from patient's son Chavez (who is an EMT). Patient reports she was feeding the dog bent down to place the food and when she got up felt dizzy and fell.  Denies head trauma. Patient crawled to the living room and waiting 3 hours for her PM aide to come. Aide called her son who thought fall was due to vasovagal as patient does not stay hydrated due to thickened fluid diet. Patient declined hospital eval yesterday evening, so son gave her PO Morphine and Ativan for pain. States patient did not have bruising around hip but admits to swelling in the left hip. Patient woke up the next morning with persistent left hip pain, rated 5/10 received PO Morphine 4mg and Ativan 0.5mg and came to the ED.  Reports patient was unable to ambulate. Of note patient has J tube placement scheduled on 1/9 with GI Dr Lozano.     Left hip x-ray showed femor fracture. CT showed Impacted left subcapital fracture. s/p Closed reduction and percutaneous pinning (CRPP) of right hip on 12/28.    Subjective  Patient was seen in room, OOB to chair. she has left hip pain- she had received Tylenol but not helpful now requesting oxycodone. She uses writing pad to communicate. She denies cp/cough/fever/chills.    REVIEW OF SYSTEMS  Constitutional: No fever, No Chills, No fatigue  HEENT: No eye pain, No visual disturbances, No difficulty hearing  Pulm: No cough,  No shortness of breath  Cardio: No chest pain, No palpitations  GI:  No abdominal pain, No nausea, No vomiting, No diarrhea, No constipation  : No dysuria, No frequency, No hematuria  Neuro: No headaches, No memory loss, + loss of strength, + numbness, No tremors  Skin: No itching, No rashes, No lesions   Endo: No temperature intolerance  MSK: + joint pain, + joint swelling, No muscle pain, No Neck or back pain  Psych:  No depression, No anxiety      PAST MEDICAL & SURGICAL HISTORY  ALS (amyotrophic lateral sclerosis)    High cholesterol      FAMILY HISTORY       SOCIAL HISTORY  Smoking - Denies  EtOH - Denies  Drugs - Denies      FUNCTIONAL HISTORY:   Patient lives  in , 3 [address] w/rail, no stairs in house.   PTA: Independent in ambulation, she has 2 HHA that assist with ADLS. She get HHA 7 days/ week for varying hours.    CURRENT FUNCTIONAL STATUS      Bed Mobility: Scooting/Bridging:     · Level of [place]	minimum assist (75% patients effort)  · Physical Assist/Nonphysical Assist	1 person + 1 person to manage equipment    Bed Mobility: Supine to Sit:     · Level of [place]	minimum assist (75% patients effort)  · Physical Assist/Nonphysical Assist	1 person + 1 person to manage equipment    Transfer: Sit to Stand:     · Level of [place]	minimum assist (75% patients effort)  · Physical Assist/Nonphysical Assist	1 person + 1 person to manage equipment  · Weight-Bearing Restrictions	full weight-bearing  · Assistive Device	rolling walker    Gait Skills:     · Level of [place]	minimum assist (75% patients effort)  · Physical Assist/Nonphysical Assist	1 person + 1 person to manage equipment  · Weight-Bearing Restrictions	full weight-bearing  · Assistive Device	rolling walker  · Gait Distance	20'    Gait Analysis:     · Gait Pattern Used	3-point gait  · Gait Deviations Noted	decreased step length      RECENT LABS/IMAGING  CBC Full  -  ( 29 Dec 2022 07:34 )  WBC Count : 4.77 K/uL  RBC Count : 3.23 M/uL  Hemoglobin : 9.6 g/dL  Hematocrit : 28.9 %  Platelet Count - Automated : 154 K/uL  Mean Cell Volume : 89.5 fl  Mean Cell Hemoglobin : 29.7 pg  Mean Cell Hemoglobin Concentration : 33.2 gm/dL  Auto Neutrophil # : x  Auto Lymphocyte # : x  Auto Monocyte # : x  Auto Eosinophil # : x  Auto Basophil # : x  Auto Neutrophil % : x  Auto Lymphocyte % : x  Auto Monocyte % : x  Auto Eosinophil % : x  Auto Basophil % : x    12-29    138  |  104  |  11  ----------------------------<  169<H>  4.6   |  26  |  0.52    Ca    8.8      29 Dec 2022 07:34    TPro  6.6  /  Alb  3.9  /  TBili  0.6  /  DBili  x   /  AST  38  /  ALT  44  /  AlkPhos  73  12-28        VITALS  T(C): 37 (12-29-22 @ 13:15), Max: 37.2 (12-28-22 @ 17:09)  HR: 105 (12-29-22 @ 13:15) (74 - 106)  BP: 108/88 (12-29-22 @ 13:15) (96/71 - 122/70)  RR: 16 (12-29-22 @ 13:15) (12 - 18)  SpO2: 100% (12-29-22 @ 13:15) (96% - 100%)  Wt(kg): --    ALLERGIES  penicillin (Unknown)      MEDICATIONS   acetaminophen     Tablet .. 650 milliGRAM(s) Oral every 6 hours PRN  aluminum hydroxide/magnesium hydroxide/simethicone Suspension 30 milliLiter(s) Oral every 4 hours PRN  enoxaparin Injectable 40 milliGRAM(s) SubCutaneous every 24 hours  HYDROmorphone  Injectable 0.5 milliGRAM(s) IV Push every 3 hours PRN  melatonin 3 milliGRAM(s) Oral at bedtime PRN  ondansetron Injectable 4 milliGRAM(s) IV Push every 8 hours PRN  oxyCODONE    IR 5 milliGRAM(s) Oral every 3 hours PRN  oxyCODONE    IR 10 milliGRAM(s) Oral every 3 hours PRN  pantoprazole    Tablet 40 milliGRAM(s) Oral before breakfast  polyethylene glycol 3350 17 Gram(s) Oral daily PRN  riluzole 50 milliGRAM(s) Oral two times a day  senna 2 Tablet(s) Oral at bedtime  sodium chloride 0.9%. 1000 milliLiter(s) IV Continuous <Continuous>      ----------------------------------------------------------------------------------------  PHYSICAL EXAM  Gen - NAD, Comfortable  HEENT - NCAT, EOMI, MMM  Neck - Supple, No limited ROM  Pulm - CTAB, No wheeze, No rhonchi, No crackles  Cardiovascular - RRR, S1S2  Abdomen - Soft, NT/ND, +BS  Extremities - No clubbing, no cyanosis, +edema to left hip, no calf tenderness  Neuro-     Cognitive - Awake, Alert, Oriented  to self, place, date, year, situation. Able to follow command     Communication - uses to communication pad      Attention: Intact      Cranial Nerves - CN 2-12 intact.     Motor -                     LEFT    UE - 4/5                    RIGHT UE - 4/5                    LEFT    LE - HF- not tested, KE 3+/5, DF 4+/5, PF 4+/5                       RIGHT LE - 5/5        Sensory - Intact to LT     Reflexes - DTR Intact  Psychiatric - Mood stable, Affect WNL  Skin:  Left hip incision with aquacel dressing  
HPI:  72F with PMH ALS (minimally verbal at baseline, uses touch pad to respond), JAQUELINE presents to the ED after fall on her left side at 5:30pm last night. History obtained by son Chavez who is an EMT at bedside. Patient reports she was feeding the dog bent down to place the food and when she got up felt dizzy and fell.  Denies head trauma. Patient crawled to the living room and waiting 3 hours for her PM aide to come. Aide called her son who thought fall was due to vasovagal as patient does not stay hydrated due to thickened fluid diet. Patient declined hospital eval yesterday evening, so son gave her PO Morphine and Ativan for pain. States patient did not have bruising around hip but admits to swelling in the left hip. Patient woke up this morning with persistent left hip pain, rated 5/10 received PO Morphine 4mg and Ativan 0.5mg and came to the ED.  Reports patient was unable to ambulate.   Of note patient has J tube placement scheduled on 1/9 with GI Dr Lozano.     In the ED: temp 97.7, /63, HR 95, RR 16, Spo2 98% RA  H/H: 10.8/33  Left hip x-ray personally reviewed femor fracture. CT hip ordered.   EKG personally reviewed NSR 75  Received 1000mL NS bolus, 4mg IV Morphine in the ED.  (28 Dec 2022 15:08)    The patient was seen and examined by me.   I have discussed my findings with Dr Ernst .   He has reviewed the films and Ct of the left hip.  I have informed the patient and son of her Left non displaced femoral neck fx.   I have explained the surgical procedure to the patient .   I have given her the details and have discussed post op recovery.   Dr Ernst will again review surgical procedure with patient prior to consent.     Vital Signs Last 24 Hrs  T(C): 37.2 (28 Dec 2022 17:09), Max: 37.2 (28 Dec 2022 17:09)  T(F): 98.9 (28 Dec 2022 17:09), Max: 98.9 (28 Dec 2022 17:09)  HR: 84 (28 Dec 2022 17:09) (84 - 95)  BP: 112/71 (28 Dec 2022 17:09) (101/63 - 112/71)  BP(mean): 85 (28 Dec 2022 17:09) (85 - 85)  RR: 16 (28 Dec 2022 17:09) (16 - 16)  SpO2: 96% (28 Dec 2022 17:09) (96% - 98%)    Parameters below as of 28 Dec 2022 17:09  Patient On (Oxygen Delivery Method): room air    PAST MEDICAL & SURGICAL HISTORY:  ALS (amyotrophic lateral sclerosis)  High cholesterol      MEDICATIONS  (STANDING):  sodium chloride 0.9%. 1000 milliLiter(s) (75 mL/Hr) IV Continuous <Continuous>    MEDICATIONS  (PRN):  acetaminophen     Tablet .. 650 milliGRAM(s) Oral every 6 hours PRN Temp greater or equal to 38C (100.4F), Mild Pain (1 - 3)  aluminum hydroxide/magnesium hydroxide/simethicone Suspension 30 milliLiter(s) Oral every 4 hours PRN Dyspepsia  melatonin 3 milliGRAM(s) Oral at bedtime PRN Insomnia  morphine  - Injectable 2 milliGRAM(s) IV Push every 4 hours PRN Severe Pain (7 - 10)  morphine  - Injectable 1 milliGRAM(s) IV Push every 4 hours PRN Moderate Pain (4 - 6)  ondansetron Injectable 4 milliGRAM(s) IV Push every 8 hours PRN Nausea and/or Vomiting      PE:  Alert and oriented X 3  Lungs:  CTA , non labored breathing   Cor: S1S2 w/o murmur  Abd:  soft, non tender +BS   Ext :   upper extremities:  able to use but decreased strength  Left leg in alignment not externally rotated or shortened. , tenderness on palpation of left thigh, no ecchymosis noted   + neurovascular intact  pedal pulses + 3  calf soft no tenderness   Right leg with normal ROM - did not range 2/2 to pain on the left.                           10.8   4.28  )-----------( 179      ( 28 Dec 2022 11:50 )             33.0   12-28    140  |  104  |  12  ----------------------------<  99  3.8   |  29  |  0.61    Ca    8.7      28 Dec 2022 11:50    TPro  6.6  /  Alb  3.9  /  TBili  0.6  /  DBili  x   /  AST  38  /  ALT  44  /  AlkPhos  73  12-28    < from: CT 3D Reconstruct w/o Workstation (12.28.22 @ 15:25) >      INTERPRETATION:  Clinical information: Fall.    Comparison: Pelvic andleft hip radiographs from 12/28/2022 at 12:27 PM.    Technique:  CT scan of the pelvis.  No intravenous contrast was administered.  3D reconstructions were created.    Findings:    There is an acute, impacted, left subcapital femoral fracture.    There is minimal spurring at the bilateral hip joints. There is   mineralization in the region of the right acetabular labrum. There is   left gluteus medius calcific tendinosis. There is mild pubic symphysis   arthrosis.    The rectum is distended with stool and air.    Impression: Impacted left subcapital femoral fracture.    --- End of Report ---    < end of copied text >

## 2022-12-29 NOTE — OCCUPATIONAL THERAPY INITIAL EVALUATION ADULT - STRENGTHENING, PT EVAL
Patient will  increase b/l UE elbow/hand/shoulder strength by 1/2 grade in order to increase safety and independence with self-care ADLs within 4 weeks.

## 2022-12-29 NOTE — DIETITIAN INITIAL EVALUATION ADULT - OTHER INFO
72F with PMH ALS, HLD presents to the ED after fall on her left side admitted for acute left femoral fracture.   Pt advanced to puree solids with moderate thick liquids this am. Assisted pt with tray set up and feeding. Pt consumed 100% of oatmeal & applesauce. Taking sips of apple juice. Noted with some coughing with puree solids. Denies any recent weight change. UBW 120lbs, current weight (12/29) 125.6lbs. Pt receptive to adding 1x Ensure High Protein (moderate thick) daily to meet assessed needs while on dysphagia diet.

## 2022-12-29 NOTE — DISCHARGE NOTE PROVIDER - NSDCFUADDINST_GEN_ALL_CORE_FT
please bring all DC papers and medications to all health care providers. Return to ER if symptoms recur and any new concerning symptoms.  please bring all DC papers and medications to all health care providers. Return to ER if symptoms recur and any new concerning symptoms.   take ensure enclave two times daily  needs 24/7 assists and supervision. needs supervised feeding. watch for sign of aspiration. stop feeding and seek urgent medical attention of any sign of aspiration like coughing, chocking, shortness of breath, grugely voice etc, feed upright.

## 2022-12-29 NOTE — OCCUPATIONAL THERAPY INITIAL EVALUATION ADULT - ADDITIONAL COMMENTS
Patient resides in a  alone with 3 CASSANDRA + HR no steps inside, walk in shower stall +GB's, standard toilet w/ bidet. Patient reports having a HHA 7x week: (5x week from 9AM to 9PM, and 2x week from 11AM to 4:30PM), who assisted with ADLs (dependent for U/LBD, toileting, bathing, FW management), able to perform self-feeding/oral hygiene with supervision, (I) with ambulation without DME, HHA completed all IADLs and drive her to appts. Pt has a son who lives local per H&P. HEATH Calvillo.

## 2022-12-29 NOTE — OCCUPATIONAL THERAPY INITIAL EVALUATION ADULT - PERTINENT HX OF CURRENT PROBLEM, REHAB EVAL
Patient is a 71 y/o female w/ PMH ALS (minimally verbal at baseline, uses touch pad to respond), HLD presents to the ED after fall on her left side at 5:30pm last night. Per H&P- history obtained by son Chavez who is an EMT at bedside. Patient reports she was feeding the dog bent down to place the food and when she got up felt dizzy and fell. Denies head trauma. Patient crawled to the living room and waiting 3 hours for her PM aide to come. Aide called her son who thought fall was due to vasovagal as patient does not stay hydrated due to thickened fluid diet. Patient declined hospital eval that evening, so son gave her PO Morphine and Ativan for pain. Patient woke up this morning with persistent left hip pain, brought to ED. Patient found to have L femoral neck fracture and is now left femoral fracture s/p Pinning on 12/28 by Dr. Shay Ernst.

## 2022-12-29 NOTE — PATIENT PROFILE ADULT - FALL HARM RISK - HARM RISK INTERVENTIONS
Assistance with ambulation/Assistance OOB with selected safe patient handling equipment/Communicate Risk of Fall with Harm to all staff/Discuss with provider need for PT consult/Monitor gait and stability/Provide patient with walking aids - walker, cane, crutches/Reinforce activity limits and safety measures with patient and family/Sit up slowly, dangle for a short time, stand at bedside before walking/Tailored Fall Risk Interventions/Use of alarms - bed, chair and/or voice tab/Visual Cue: Yellow wristband and red socks/Bed in lowest position, wheels locked, appropriate side rails in place/Call bell, personal items and telephone in reach/Instruct patient to call for assistance before getting out of bed or chair/Non-slip footwear when patient is out of bed/[place] to call system/Physically safe environment - no spills, clutter or unnecessary equipment/Purposeful Proactive Rounding/Room/bathroom lighting operational, light cord in reach

## 2022-12-29 NOTE — PATIENT PROFILE ADULT - FUNCTIONAL ASSESSMENT - BASIC MOBILITY 6.
3-calculated by average/Not able to assess (calculate score using [facility] averaging method)

## 2022-12-29 NOTE — OCCUPATIONAL THERAPY INITIAL EVALUATION ADULT - MD ORDER
"OT Evaluate and Treat"- MD orders received. Chart reviewed, contents noted, conferred with GERMAIN Crocker "OT Evaluate and Treat"- MD orders received. Chart reviewed, contents noted, conferred with GERMAIN Crocker.

## 2022-12-29 NOTE — PATIENT PROFILE ADULT - NSPROPTRIGHTCAREGIVER_GEN_A_NUR
You are medically cleared for the planned procedure and anesthesia.      Recommend :     -Nothing to eat or drink after midnight prior to surgery.    -Leave all jewelry/valuables at home.  -Bring a responsible adult to drive you home after surgery.    Medication instructions:     To lower bleeding risk        -Avoid Aspirin and NSAIDS ( Ibuprofen , Advil , Motrin , naproxen , Alleve )   prior to surgery .     -Acetaminophen or Tylenol is okay for pain  .             Please contact the office with additional questions.   Thank you.     Gurjit Bagley MD 04/15/19       no

## 2022-12-29 NOTE — CONSULT NOTE ADULT - ASSESSMENT
------------------------------------------------------------------------------------------------  ASSESSMENT/PLAN  This is a 71 YO Female with PMH  of ALS (minimally verbal at baseline, uses touch pad to respond), HLD presents to [facility] ED on 12/28 after fall on her left side at 5:30pm the night prior.Of note patient has J tube placement scheduled on 1/9 with GI Dr Lozano. Left hip x-ray showed femor fracture. CT showed Impacted left subcapital fracture. s/p Closed reduction and percutaneous pinning (CRPP) of right hip on 12/28.  Medical management per hospitalist  Pain management -Tylenol, oxycodone  DVT PPX : SCDs, lovenox   GI ppx: prootonix  Bowel Regimen: senna, miralax  WB status: WBAT LEFT LEG  f/u  J tube placement scheduled on 1/9 with GI Dr Lozano.     Rehab Disposition: -  Sub-acute Rehab      Recommend MARTINA, patient DOES NOT meet acute inpatient rehabilitation criteria. Patient is not an acute rehab candidate due to inability to tolerated 3 hours of therapy and low prior level of function.  She required assistance from As.       
72 year old female with hx of ALS ( minimally verbal communicates with touch pad) , hld, was brought into ER today with hx of  fall last pm .      IMP:  Impacted left subcapital fracture          hx of ALS - patient does ambulate on her own.                         Plan:  Discussed Plan  with Dr Ernst and with patient and son.           Left hip pinning this pm  -  patient is medically optimized.

## 2022-12-29 NOTE — PROGRESS NOTE ADULT - ASSESSMENT
72 year old female with hx of ALS ( progressive ) tolerates thickened fluids and pureed foods, is stable pod # 1    Plan:  PT /OT WBAT left leg           Lovenox for DVT prophylaxis            pain management            Acute rehab eval

## 2022-12-29 NOTE — DIETITIAN INITIAL EVALUATION ADULT - PERTINENT MEDS FT
MEDICATIONS  (STANDING):  enoxaparin Injectable 40 milliGRAM(s) SubCutaneous every 24 hours  pantoprazole    Tablet 40 milliGRAM(s) Oral before breakfast  riluzole 50 milliGRAM(s) Oral two times a day  senna 2 Tablet(s) Oral at bedtime  sodium chloride 0.9%. 1000 milliLiter(s) (75 mL/Hr) IV Continuous <Continuous>    MEDICATIONS  (PRN):  acetaminophen     Tablet .. 650 milliGRAM(s) Oral every 6 hours PRN Temp greater or equal to 38C (100.4F), Mild Pain (1 - 3)  aluminum hydroxide/magnesium hydroxide/simethicone Suspension 30 milliLiter(s) Oral every 4 hours PRN Dyspepsia  HYDROmorphone  Injectable 0.5 milliGRAM(s) IV Push every 3 hours PRN Severe Pain (7 - 10)  melatonin 3 milliGRAM(s) Oral at bedtime PRN Insomnia  ondansetron Injectable 4 milliGRAM(s) IV Push every 8 hours PRN Nausea and/or Vomiting  oxyCODONE    IR 5 milliGRAM(s) Oral every 3 hours PRN Mild Pain (1 - 3)  oxyCODONE    IR 10 milliGRAM(s) Oral every 3 hours PRN Moderate Pain (4 - 6)  polyethylene glycol 3350 17 Gram(s) Oral daily PRN Constipation

## 2022-12-29 NOTE — PHYSICAL THERAPY INITIAL EVALUATION ADULT - PERTINENT HX OF CURRENT PROBLEM, REHAB EVAL
72F with PMH ALS (minimally verbal at baseline, uses touch pad to respond), JAQUELINE presents to the ED after fall on her left side at 5:30pm last night. History obtained by son Chavez who is an EMT at bedside. Patient reports she was feeding the dog bent down to place the food and when she got up felt dizzy and fell.  Denies head trauma. Patient crawled to the living room and waiting 3 hours for her PM aide to come. Aide called her son who thought fall was due to vasovagal as patient does not stay hydrated due to thickened fluid diet. Patient declined hospital eval yesterday evening, so son gave her PO Morphine and Ativan for pain. States patient did not have bruising around hip but admits to swelling in the left hip. Patient woke up this morning with persistent left hip pain, rated 5/10 received PO Morphine 4mg and Ativan 0.5mg and came to the ED.  Reports patient was unable to ambulate.

## 2022-12-29 NOTE — OCCUPATIONAL THERAPY INITIAL EVALUATION ADULT - GENERAL OBSERVATIONS, REHAB EVAL
Patient rec'd/left sitting in bedside chair, chair alarmed, +prima fit, +CM, +IV, lines intact, needs met, agreeable to OT IE.

## 2022-12-29 NOTE — DISCHARGE NOTE PROVIDER - CARE PROVIDER_API CALL
Shay Ernst)  Orthopedics  [address], [address]  [place] [address]  Phone: [phone]  Fax: [phone]  Follow Up Time:    Shay Ernst)  Orthopedics  [address], [address]  [place] [address]  Phone: [phone]  Fax: [phone]  Follow Up Time:     primary care MD,   Phone: (   )    -  Fax: (   )    -  Follow Up Time:

## 2022-12-29 NOTE — DIETITIAN INITIAL EVALUATION ADULT - ORAL INTAKE PTA/DIET HISTORY
Pt able to communicate via writing board. Endorses hx dysphagia in setting of ALS. Usually takes puree with moderate thick liquids, but noted with plan for PEJ placement on 1/9/22. Pt reports good appetite, usually drinks 1x Ensure daily. Able to self feed with tray set up and cutting assistance. NKFA.

## 2022-12-29 NOTE — OCCUPATIONAL THERAPY INITIAL EVALUATION ADULT - RANGE OF MOTION EXAMINATION
b/l shoulders pt unable to show much AROM/PROM WFL, b/l elbows WFL AROM, grasp AROM WFL- grasp weak b/l

## 2022-12-29 NOTE — PHYSICAL THERAPY INITIAL EVALUATION ADULT - ADDITIONAL COMMENTS
pt lives  in private house, 3 darrin w/rail, no stairs in house. pt independent w/ambulation, has 2 hha's to assist w/ADL's. pt states no DME in home

## 2022-12-29 NOTE — DISCHARGE NOTE PROVIDER - NSDCMRMEDTOKEN_GEN_ALL_CORE_FT
Radicava  mg/5 mL oral suspension: 5 milliliter(s) intravenous once a day (in the morning)  riluzole 50 mg oral tablet: 1 tab(s) orally 2 times a day   acetaminophen 325 mg oral tablet: 2 tab(s) orally every 6 hours, As needed, Temp greater or equal to 38C (100.4F), Mild Pain (1 - 3)  aluminum hydroxide-magnesium hydroxide 200 mg-200 mg/5 mL oral suspension: 30 milliliter(s) orally every 4 hours, As needed, Dyspepsia  melatonin 3 mg oral tablet: 1 tab(s) orally once a day (at bedtime), As needed, Insomnia  pantoprazole 40 mg oral delayed release tablet: 1 tab(s) orally once a day (before a meal)  polyethylene glycol 3350 oral powder for reconstitution: 17 gram(s) orally once a day, As needed, Constipation  riluzole 50 mg oral tablet: 1 tab(s) orally 2 times a day  senna leaf extract oral tablet: 2 tab(s) orally once a day (at bedtime)   acetaminophen 325 mg oral tablet: 2 tab(s) orally every 6 hours, As needed, Temp greater or equal to 38C (100.4F), Mild Pain (1 - 3)  aluminum hydroxide-magnesium hydroxide 200 mg-200 mg/5 mL oral suspension: 30 milliliter(s) orally every 4 hours, As needed, Dyspepsia  Lovenox 40 mg/0.4 mL injectable solution: 40 milligram(s) subcutaneously once a day for 21 days  melatonin 3 mg oral tablet: 1 tab(s) orally once a day (at bedtime), As needed, Insomnia  pantoprazole 40 mg oral delayed release tablet: 1 tab(s) orally once a day (before a meal)  polyethylene glycol 3350 oral powder for reconstitution: 17 gram(s) orally once a day, As needed, Constipation  Radicava 105 mg/ 5 ml: 5 milliliter(s) orally once a day 10 out of 14 days then 14 days off. next dose to start on Jan 5, 2023 per abram Byrne  riluzole 50 mg oral tablet: 1 tab(s) orally 2 times a day  senna leaf extract oral tablet: 2 tab(s) orally once a day (at bedtime)

## 2022-12-29 NOTE — DISCHARGE NOTE PROVIDER - PROVIDER TOKENS
PROVIDER:[TOKEN:[[id number]]] PROVIDER:[TOKEN:[[id number]]],FREE:[LAST:[primary care MD],PHONE:[(   )    -],FAX:[(   )    -]]

## 2022-12-29 NOTE — OCCUPATIONAL THERAPY INITIAL EVALUATION ADULT - LEVEL OF INDEPENDENCE: DRESS UPPER BODY, OT EVAL
SISI Harrison at bedside. Placed Femstop to right groin at 30mmHg. Per PA, maintain Femstop at 30mmHg overnight. Page CV Surgery with any concerning changes to limb.   at baseline/dependent (less than 25% patients effort)

## 2022-12-29 NOTE — PROGRESS NOTE ADULT - ASSESSMENT
72F with PMH ALS, HLD presents to the ED after fall on her left side admitted for acute left femoral fracture.     #Left femoral fracture s/p Pinning on 12/28 by Dr. Shay Ernst  - control pain  - DVT-P  - PT/OT/PM&R. patient and son agreeable for short AR  - WBAT per ortho  - fall precautions    # NCN Anemia  - Likely anemia of chronic disease   - per ortho blood loss was minimal and should have acute blood loss with pinning  - Monitor H/H  - keep type and screen active  -Follow up AM CBC    #HLD  -Not on medication per son   -Supportive care    #ALS  # Dysphagia  - Patient on puree with thickened liquids at baseline  - diet resume to pureed with mod thick this am.   - IVF will be dced in pm,  - SLP eval pending  -Takes Radicava and Riluzole at home, son to bring in medication  - Supportive care    #Prophylactic measure  DVT ppx: lovenox  GI: PPI  Dispo: anticipate DC in 24 hrs    Current comorbidities, plan of care, return precautions, fall preventions discussed with the patient and son Chavez. verbalized understanding and agreed with the plan. All questions were answered at his/her satisfaction.         Discussed with son Chavez at bedside, aware and in agreement  with above.  72F with PMH ALS, HLD presents to the ED after fall on her left side admitted for acute left femoral fracture.     #Left femoral fracture s/p Pinning on 12/28 by Dr. Shay Ernst  - control pain  - DVT-P  - PT/OT/PM&R. patient and son agreeable for short AR  - WBAT per ortho  - fall precautions    # NCN Anemia  - Likely anemia of chronic disease   - per ortho blood loss was minimal and should have acute blood loss with pinning  - Monitor H/H  - keep type and screen active  -Follow up AM CBC    #HLD  -Not on medication per son   -Supportive care    #ALS  # Dysphagia  - Patient on puree with thickened liquids at baseline  - diet resume to pureed with mod thick this am.   - IVF will be dced in pm,  - SLP eval pending  -Takes Radicava and Riluzole at home, son to bring in medication. per chavez, patient is only taking riluzole now. her radicava is due to Jan 5  - Supportive care    #Prophylactic measure  DVT ppx: lovenox  GI: PPI  Dispo: anticipate DC in 24 hrs    Current comorbidities, plan of care, return precautions, fall preventions discussed with the patient and son Chavez. verbalized understanding and agreed with the plan. All questions were answered at his/her satisfaction.         Discussed with son Chavez at bedside, aware and in agreement  with above.

## 2022-12-29 NOTE — OCCUPATIONAL THERAPY INITIAL EVALUATION ADULT - TRANSFER TRAINING, PT EVAL
Patient will improve toilet/commode transfer to minimal assistance x 1 in 3-5 treatment sessions using necessary DME.

## 2022-12-30 ENCOUNTER — TRANSCRIPTION ENCOUNTER (OUTPATIENT)
Age: 72
End: 2022-12-30

## 2022-12-30 VITALS
DIASTOLIC BLOOD PRESSURE: 63 MMHG | RESPIRATION RATE: 16 BRPM | TEMPERATURE: 99 F | OXYGEN SATURATION: 97 % | SYSTOLIC BLOOD PRESSURE: 101 MMHG | HEART RATE: 84 BPM

## 2022-12-30 PROBLEM — E78.00 PURE HYPERCHOLESTEROLEMIA, UNSPECIFIED: Chronic | Status: ACTIVE | Noted: 2022-12-28

## 2022-12-30 PROBLEM — G12.21 AMYOTROPHIC LATERAL SCLEROSIS: Chronic | Status: ACTIVE | Noted: 2022-12-28

## 2022-12-30 LAB
A1C WITH ESTIMATED AVERAGE GLUCOSE RESULT: 5 % — SIGNIFICANT CHANGE UP (ref 4–5.6)
ANION GAP SERPL CALC-SCNC: 6 MMOL/L — SIGNIFICANT CHANGE UP (ref 5–17)
BUN SERPL-MCNC: 13 MG/DL — SIGNIFICANT CHANGE UP (ref 7–23)
CALCIUM SERPL-MCNC: 8.6 MG/DL — SIGNIFICANT CHANGE UP (ref 8.4–10.5)
CHLORIDE SERPL-SCNC: 108 MMOL/L — SIGNIFICANT CHANGE UP (ref 96–108)
CHOLEST SERPL-MCNC: 150 MG/DL — SIGNIFICANT CHANGE UP
CO2 SERPL-SCNC: 29 MMOL/L — SIGNIFICANT CHANGE UP (ref 22–31)
CREAT SERPL-MCNC: 0.58 MG/DL — SIGNIFICANT CHANGE UP (ref 0.5–1.3)
EGFR: 97 ML/MIN/1.73M2 — SIGNIFICANT CHANGE UP
ESTIMATED AVERAGE GLUCOSE: 97 MG/DL — SIGNIFICANT CHANGE UP (ref 68–114)
GLUCOSE SERPL-MCNC: 97 MG/DL — SIGNIFICANT CHANGE UP (ref 70–99)
HCT VFR BLD CALC: 24.9 % — LOW (ref 34.5–45)
HDLC SERPL-MCNC: 67 MG/DL — SIGNIFICANT CHANGE UP
HGB BLD-MCNC: 8.3 G/DL — LOW (ref 11.5–15.5)
LIPID PNL WITH DIRECT LDL SERPL: 70 MG/DL — SIGNIFICANT CHANGE UP
MCHC RBC-ENTMCNC: 30 PG — SIGNIFICANT CHANGE UP (ref 27–34)
MCHC RBC-ENTMCNC: 33.3 GM/DL — SIGNIFICANT CHANGE UP (ref 32–36)
MCV RBC AUTO: 89.9 FL — SIGNIFICANT CHANGE UP (ref 80–100)
NON HDL CHOLESTEROL: 83 MG/DL — SIGNIFICANT CHANGE UP
NRBC # BLD: 0 /100 WBCS — SIGNIFICANT CHANGE UP (ref 0–0)
PLATELET # BLD AUTO: 151 K/UL — SIGNIFICANT CHANGE UP (ref 150–400)
POTASSIUM SERPL-MCNC: 3.8 MMOL/L — SIGNIFICANT CHANGE UP (ref 3.5–5.3)
POTASSIUM SERPL-SCNC: 3.8 MMOL/L — SIGNIFICANT CHANGE UP (ref 3.5–5.3)
RBC # BLD: 2.77 M/UL — LOW (ref 3.8–5.2)
RBC # FLD: 13.9 % — SIGNIFICANT CHANGE UP (ref 10.3–14.5)
SODIUM SERPL-SCNC: 143 MMOL/L — SIGNIFICANT CHANGE UP (ref 135–145)
TRIGL SERPL-MCNC: 64 MG/DL — SIGNIFICANT CHANGE UP
WBC # BLD: 3.9 K/UL — SIGNIFICANT CHANGE UP (ref 3.8–10.5)
WBC # FLD AUTO: 3.9 K/UL — SIGNIFICANT CHANGE UP (ref 3.8–10.5)

## 2022-12-30 PROCEDURE — 99239 HOSP IP/OBS DSCHRG MGMT >30: CPT

## 2022-12-30 RX ORDER — PANTOPRAZOLE SODIUM 20 MG/1
1 TABLET, DELAYED RELEASE ORAL
Qty: 0 | Refills: 0 | DISCHARGE
Start: 2022-12-30

## 2022-12-30 RX ORDER — SENNA PLUS 8.6 MG/1
2 TABLET ORAL
Qty: 0 | Refills: 0 | DISCHARGE
Start: 2022-12-30

## 2022-12-30 RX ORDER — ENOXAPARIN SODIUM 100 MG/ML
40 INJECTION SUBCUTANEOUS
Qty: 1 | Refills: 0
Start: 2022-12-30

## 2022-12-30 RX ORDER — POLYETHYLENE GLYCOL 3350 17 G/17G
17 POWDER, FOR SOLUTION ORAL
Qty: 0 | Refills: 0 | DISCHARGE
Start: 2022-12-30

## 2022-12-30 RX ORDER — LANOLIN ALCOHOL/MO/W.PET/CERES
1 CREAM (GRAM) TOPICAL
Qty: 0 | Refills: 0 | DISCHARGE
Start: 2022-12-30

## 2022-12-30 RX ORDER — ACETAMINOPHEN 500 MG
2 TABLET ORAL
Qty: 0 | Refills: 0 | DISCHARGE
Start: 2022-12-30

## 2022-12-30 RX ORDER — EDARAVONE 105 MG/5ML
5 KIT ORAL
Qty: 0 | Refills: 0 | DISCHARGE

## 2022-12-30 RX ORDER — RILUZOLE 50 MG/1
1 TABLET ORAL
Qty: 0 | Refills: 0 | DISCHARGE

## 2022-12-30 RX ADMIN — RILUZOLE 50 MILLIGRAM(S): 50 TABLET ORAL at 05:00

## 2022-12-30 RX ADMIN — ONDANSETRON 4 MILLIGRAM(S): 8 TABLET, FILM COATED ORAL at 04:25

## 2022-12-30 RX ADMIN — ENOXAPARIN SODIUM 40 MILLIGRAM(S): 100 INJECTION SUBCUTANEOUS at 04:54

## 2022-12-30 RX ADMIN — OXYCODONE HYDROCHLORIDE 5 MILLIGRAM(S): 5 TABLET ORAL at 15:00

## 2022-12-30 RX ADMIN — Medication 650 MILLIGRAM(S): at 04:55

## 2022-12-30 RX ADMIN — HYDROMORPHONE HYDROCHLORIDE 0.5 MILLIGRAM(S): 2 INJECTION INTRAMUSCULAR; INTRAVENOUS; SUBCUTANEOUS at 04:25

## 2022-12-30 RX ADMIN — OXYCODONE HYDROCHLORIDE 5 MILLIGRAM(S): 5 TABLET ORAL at 14:42

## 2022-12-30 RX ADMIN — HYDROMORPHONE HYDROCHLORIDE 0.5 MILLIGRAM(S): 2 INJECTION INTRAMUSCULAR; INTRAVENOUS; SUBCUTANEOUS at 05:07

## 2022-12-30 RX ADMIN — PANTOPRAZOLE SODIUM 40 MILLIGRAM(S): 20 TABLET, DELAYED RELEASE ORAL at 06:45

## 2022-12-30 NOTE — DISCHARGE NOTE NURSING/CASE MANAGEMENT/SOCIAL WORK - PATIENT PORTAL LINK FT
You can access the FollowMyHealth Patient Portal offered by [facility] by registering at the following website: [email or web address]. By joining [employer]’s FollowMyHealth portal, you will also be able to view your health information using other applications (apps) compatible with our system.

## 2022-12-30 NOTE — PROGRESS NOTE ADULT - SUBJECTIVE AND OBJECTIVE BOX
Medicine Progress Note    Patient is a 72y old  Female who presents with a chief complaint of left femoral neck fracture (29 Dec 2022 07:17)      SUBJECTIVE / OVERNIGHT EVENTS:  seen and examined  Chart reviewed  No overnight events  BM+  No pain  No complaints  left hip Pinning POD1. pending PT/OT/PM&R    ADDITIONAL REVIEW OF SYSTEMS:  no fever/chills/CP/sob/palpitation/dizziness/ abd pain/nausea/vomiting/diarrhea/constipation/headaches. all other ROS neg    MEDICATIONS  (STANDING):  enoxaparin Injectable 40 milliGRAM(s) SubCutaneous every 24 hours  riluzole 50 milliGRAM(s) Oral two times a day  sodium chloride 0.9%. 1000 milliLiter(s) (75 mL/Hr) IV Continuous <Continuous>    MEDICATIONS  (PRN):  acetaminophen     Tablet .. 650 milliGRAM(s) Oral every 6 hours PRN Temp greater or equal to 38C (100.4F), Mild Pain (1 - 3)  aluminum hydroxide/magnesium hydroxide/simethicone Suspension 30 milliLiter(s) Oral every 4 hours PRN Dyspepsia  HYDROmorphone  Injectable 0.5 milliGRAM(s) IV Push every 3 hours PRN Severe Pain (7 - 10)  melatonin 3 milliGRAM(s) Oral at bedtime PRN Insomnia  ondansetron Injectable 4 milliGRAM(s) IV Push every 8 hours PRN Nausea and/or Vomiting  oxyCODONE    IR 5 milliGRAM(s) Oral every 3 hours PRN Mild Pain (1 - 3)  oxyCODONE    IR 10 milliGRAM(s) Oral every 3 hours PRN Moderate Pain (4 - 6)    CAPILLARY BLOOD GLUCOSE        I&O's Summary      PHYSICAL EXAM:  Vital Signs Last 24 Hrs  T(C): 36.3 (29 Dec 2022 05:08), Max: 37.2 (28 Dec 2022 17:09)  T(F): 97.4 (29 Dec 2022 05:08), Max: 98.9 (28 Dec 2022 17:09)  HR: 87 (29 Dec 2022 05:08) (74 - 95)  BP: 103/70 (29 Dec 2022 05:08) (101/63 - 122/70)  BP(mean): 85 (28 Dec 2022 17:09) (85 - 85)  RR: 14 (29 Dec 2022 05:08) (12 - 18)  SpO2: 97% (29 Dec 2022 05:08) (96% - 98%)    Parameters below as of 29 Dec 2022 05:08  Patient On (Oxygen Delivery Method): room air    GENERAL: Not in distress. Alert    HEENT: clear conjuctiva, MMM. no pallor or icterus  CARDIOVASCULAR: RRR S1, S2. No murmur/rubs/gallop  LUNGS: BLAE+, no rales, no wheezing, no rhonchi.    ABDOMEN: ND. Soft,  NT, no guarding / rebound / rigidity. BS normoactive  BACK: No spine tenderness.  EXTREMITIES: + LLE edema. no leg or calf TP. left hip NT.   SKIN: warm and dry. left hip dressing dry  PSYCHIATRIC: Calm.  No agitation.    LABS:                        9.6    4.77  )-----------( 154      ( 29 Dec 2022 07:34 )             28.9     12-29    138  |  104  |  11  ----------------------------<  169<H>  4.6   |  26  |  0.52    Ca    8.8      29 Dec 2022 07:34    TPro  6.6  /  Alb  3.9  /  TBili  0.6  /  DBili  x   /  AST  38  /  ALT  44  /  AlkPhos  73  12-28    PT/INR - ( 28 Dec 2022 11:50 )   PT: 12.6 sec;   INR: 1.09 ratio         PTT - ( 28 Dec 2022 11:50 )  PTT:31.6 sec              RADIOLOGY & ADDITIONAL TESTS:  Imaging from Last 24 Hours:    Electrocardiogram/QTc Interval:    COORDINATION OF CARE:  Care Discussed with Consultants/Other Providers:  
Patient is s/p Left hip pinning POD #1    Patient was seen and examined by me today.  Patient is sitting up in bed resting comfortably and eating breakfast.   She is not c/o pain.   She is non verbal but answers questions with her tablet.  She will be attempting to get OOB with PT this am.   She denies CP or SOB.    Vital Signs Last 24 Hrs  T(C): 36.3 (29 Dec 2022 05:08), Max: 37.2 (28 Dec 2022 17:09)  T(F): 97.4 (29 Dec 2022 05:08), Max: 98.9 (28 Dec 2022 17:09)  HR: 87 (29 Dec 2022 05:08) (74 - 87)  BP: 103/70 (29 Dec 2022 05:08) (103/70 - 122/70)  BP(mean): 85 (28 Dec 2022 17:09) (85 - 85)  RR: 14 (29 Dec 2022 05:08) (12 - 18)  SpO2: 97% (29 Dec 2022 05:08) (96% - 97%)    Parameters below as of 29 Dec 2022 05:08  Patient On (Oxygen Delivery Method): room air    PAST MEDICAL & SURGICAL HISTORY:  ALS (amyotrophic lateral sclerosis)  High cholesterol    Home Medications:  Radicava  mg/5 mL oral suspension: 5 milliliter(s) intravenous once a day (in the morning) (28 Dec 2022 16:37)  riluzole 50 mg oral tablet: 1 tab(s) orally 2 times a day (28 Dec 2022 16:37)    MEDICATIONS  (PRN):  acetaminophen     Tablet .. 650 milliGRAM(s) Oral every 6 hours PRN Temp greater or equal to 38C (100.4F), Mild Pain (1 - 3)  aluminum hydroxide/magnesium hydroxide/simethicone Suspension 30 milliLiter(s) Oral every 4 hours PRN Dyspepsia  HYDROmorphone  Injectable 0.5 milliGRAM(s) IV Push every 3 hours PRN Severe Pain (7 - 10)  melatonin 3 milliGRAM(s) Oral at bedtime PRN Insomnia  ondansetron Injectable 4 milliGRAM(s) IV Push every 8 hours PRN Nausea and/or Vomiting  oxyCODONE    IR 5 milliGRAM(s) Oral every 3 hours PRN Mild Pain (1 - 3)  oxyCODONE    IR 10 milliGRAM(s) Oral every 3 hours PRN Moderate Pain (4 - 6)  polyethylene glycol 3350 17 Gram(s) Oral daily PRN Constipation    PE:  Alert and oriented X 3  Lungs:  CTA   Cor:  RR S1S2  Abd:  soft, non tender, +BS , voiding   Ext: Weakness in upper extremities.    Left hip dressing clean and dry, thigh supple, + neurovascular intact   SCD 's bilateral while in bed.                          9.6    4.77  )-----------( 154      ( 29 Dec 2022 07:34 )             28.9     12-29    138  |  104  |  11  ----------------------------<  169<H>  4.6   |  26  |  0.52    Ca    8.8      29 Dec 2022 07:34    TPro  6.6  /  Alb  3.9  /  TBili  0.6  /  DBili  x   /  AST  38  /  ALT  44  /  AlkPhos  73  12-28      
S: No acute events. Pain well controlled. Was able to walk w/PT today.    O:  NAD  NLR  ext wwp    L hip:  dressing c/d/i  NVI

## 2022-12-30 NOTE — PROGRESS NOTE ADULT - ASSESSMENT
POD 2 s/p L hip percutaneous screw fixation of femoral neck fracture  - WBAT LLE  - dvt ppx  - OOB, PT  - dispo planning  - f/u in 10-14 days in the office

## 2022-12-30 NOTE — DISCHARGE NOTE NURSING/CASE MANAGEMENT/SOCIAL WORK - NSDCPEFALRISK_GEN_ALL_CORE
For information on Fall & Injury Prevention, visit: [email or web address] OR  [email or web address] OR  https://www.cdc.gov/steadi/patient.html

## 2023-01-01 PROCEDURE — 86901 BLOOD TYPING SEROLOGIC RH(D): CPT

## 2023-01-01 PROCEDURE — 80048 BASIC METABOLIC PNL TOTAL CA: CPT

## 2023-01-01 PROCEDURE — 85730 THROMBOPLASTIN TIME PARTIAL: CPT

## 2023-01-01 PROCEDURE — 85025 COMPLETE CBC W/AUTO DIFF WBC: CPT

## 2023-01-01 PROCEDURE — 96374 THER/PROPH/DIAG INJ IV PUSH: CPT

## 2023-01-01 PROCEDURE — 86900 BLOOD TYPING SEROLOGIC ABO: CPT

## 2023-01-01 PROCEDURE — 87637 SARSCOV2&INF A&B&RSV AMP PRB: CPT

## 2023-01-01 PROCEDURE — 73502 X-RAY EXAM HIP UNI 2-3 VIEWS: CPT

## 2023-01-01 PROCEDURE — 84484 ASSAY OF TROPONIN QUANT: CPT

## 2023-01-01 PROCEDURE — C1713: CPT

## 2023-01-01 PROCEDURE — 76376 3D RENDER W/INTRP POSTPROCES: CPT

## 2023-01-01 PROCEDURE — 73552 X-RAY EXAM OF FEMUR 2/>: CPT

## 2023-01-01 PROCEDURE — 97162 PT EVAL MOD COMPLEX 30 MIN: CPT

## 2023-01-01 PROCEDURE — 85610 PROTHROMBIN TIME: CPT

## 2023-01-01 PROCEDURE — 80053 COMPREHEN METABOLIC PANEL: CPT

## 2023-01-01 PROCEDURE — 72192 CT PELVIS W/O DYE: CPT | Mod: MA

## 2023-01-01 PROCEDURE — 71045 X-RAY EXAM CHEST 1 VIEW: CPT

## 2023-01-01 PROCEDURE — 73562 X-RAY EXAM OF KNEE 3: CPT

## 2023-01-01 PROCEDURE — 97530 THERAPEUTIC ACTIVITIES: CPT

## 2023-01-01 PROCEDURE — 96361 HYDRATE IV INFUSION ADD-ON: CPT

## 2023-01-01 PROCEDURE — 86850 RBC ANTIBODY SCREEN: CPT

## 2023-01-01 PROCEDURE — 97166 OT EVAL MOD COMPLEX 45 MIN: CPT

## 2023-01-01 PROCEDURE — 85027 COMPLETE CBC AUTOMATED: CPT

## 2023-01-01 PROCEDURE — 83036 HEMOGLOBIN GLYCOSYLATED A1C: CPT

## 2023-01-01 PROCEDURE — 99285 EMERGENCY DEPT VISIT HI MDM: CPT | Mod: 25

## 2023-01-01 PROCEDURE — 93005 ELECTROCARDIOGRAM TRACING: CPT

## 2023-01-01 PROCEDURE — 36415 COLL VENOUS BLD VENIPUNCTURE: CPT

## 2023-01-01 PROCEDURE — 80061 LIPID PANEL: CPT

## 2023-01-01 PROCEDURE — 76000 FLUOROSCOPY <1 HR PHYS/QHP: CPT

## 2023-01-01 PROCEDURE — 86803 HEPATITIS C AB TEST: CPT

## 2023-01-02 ENCOUNTER — NON-APPOINTMENT (OUTPATIENT)
Age: 73
End: 2023-01-02

## 2023-01-11 ENCOUNTER — NON-APPOINTMENT (OUTPATIENT)
Age: 73
End: 2023-01-11

## 2023-01-11 ENCOUNTER — APPOINTMENT (OUTPATIENT)
Dept: ORTHOPEDIC SURGERY | Facility: CLINIC | Age: 73
End: 2023-01-11
Payer: MEDICARE

## 2023-01-11 VITALS — DIASTOLIC BLOOD PRESSURE: 61 MMHG | HEART RATE: 94 BPM | SYSTOLIC BLOOD PRESSURE: 83 MMHG

## 2023-01-11 DIAGNOSIS — S72.002A FRACTURE OF UNSPECIFIED PART OF NECK OF LEFT FEMUR, INITIAL ENCOUNTER FOR CLOSED FRACTURE: ICD-10-CM

## 2023-01-11 PROCEDURE — 99024 POSTOP FOLLOW-UP VISIT: CPT

## 2023-01-11 PROCEDURE — 73502 X-RAY EXAM HIP UNI 2-3 VIEWS: CPT | Mod: LT

## 2023-01-11 NOTE — HISTORY OF PRESENT ILLNESS
[___ Weeks Post Op] : [unfilled] weeks post op [de-identified] : S/p percutaneous screw fixation of the left femoral neck. [de-identified] : First postoperative visit after percutaneous screw fixation of the left femoral neck on 12/28/2022.  She is doing well overall.  Her hip pain is much improved.  Currently she only has about 4 out of 10 hip pain when she walks.  She reports some swelling along the incision area but denies any drainage or any issues with the incision.  She has been working with physical therapy and has been improving her ability to walk.  She does report some difficulty sleeping on her back which leads to some back pain. [de-identified] : General: No apparent distress\par Cardiovascular: extremities warm and well-perfused, no cyanosis\par Pulmonary: non labored respirations\par \par Left hip:\par Moderate swelling around incision\par Incision healing well with no erythema or drainage\par Able to flex hip and plantarflex and dorsiflex ankle [de-identified] : 2 views of the left hip obtained today and interpreted by me demonstrate percutaneous screw fixation of left femoral neck with similar appearance to postoperative x-rays with maintained alignment and no apparent complication.  There is minimal callus formation. [de-identified] : First postoperative visit after above surgery [de-identified] : She is doing well.  The x-rays show that the fracture is in maintained alignment.  She should continue with physical therapy to improve her ambulation ability.  She should continue to use a walker but may transition to a cane as tolerated.  I advised her that she can sleep on her stomach or on her right side but she should avoid sleeping on her left side.  I will see her back in 4 weeks with repeat evaluation repeat x-rays.  The patient expressed understanding of her diagnosis and treatment plan and all questions were answered.\par \par This note was generated using dragon medical dictation software.  A reasonable effort has been made for proofreading its contents, but typos may still remain.  If there are any questions or points of clarification needed please notify my office.

## 2023-02-08 ENCOUNTER — APPOINTMENT (OUTPATIENT)
Dept: ORTHOPEDIC SURGERY | Facility: CLINIC | Age: 73
End: 2023-02-08
Payer: MEDICARE

## 2023-02-08 VITALS — HEART RATE: 77 BPM | DIASTOLIC BLOOD PRESSURE: 67 MMHG | SYSTOLIC BLOOD PRESSURE: 101 MMHG

## 2023-02-08 PROCEDURE — 73502 X-RAY EXAM HIP UNI 2-3 VIEWS: CPT | Mod: LT

## 2023-02-08 PROCEDURE — 99024 POSTOP FOLLOW-UP VISIT: CPT

## 2023-02-08 NOTE — HISTORY OF PRESENT ILLNESS
[___ Weeks Post Op] : [unfilled] weeks post op [de-identified] : S/p percutaneous screw fixation of the left femoral neck. DOS: 12/28/2022. [de-identified] : Second postoperative visit after percutaneous screw fixation of the left femoral neck on 12/28/2022.  She reports that her left hip is doing well and she really has no pain in the left hip.  She has been doing physical therapy and has been walking with only a cane.  She does report some mild swelling to the left hip area. [de-identified] : General: No apparent distress\par Cardiovascular: extremities warm and well-perfused, no cyanosis\par Pulmonary: non labored respirations\par \par Left hip:\par Palpable subcutaneous swelling at surgical site, no erythema, no induration, no drainage\par Able to stand and move hip without any pain [de-identified] : 2 views of the left hip obtained today and interpreted by me demonstrate maintained alignment of left femoral neck fracture with intact hardware no apparent complication [de-identified] : 6 weeks status post left femoral neck fracture percutaneous screw fixation doing well. [de-identified] : Discussed with her and her family member that she is doing well and her fracture appears to be healing well.  I recommend continued physical therapy.  I will see her back in about 6 weeks for repeat evaluation and repeat x-rays.

## 2023-02-12 ENCOUNTER — TRANSCRIPTION ENCOUNTER (OUTPATIENT)
Age: 73
End: 2023-02-12

## 2023-02-13 ENCOUNTER — TRANSCRIPTION ENCOUNTER (OUTPATIENT)
Age: 73
End: 2023-02-13

## 2023-02-13 ENCOUNTER — OUTPATIENT (OUTPATIENT)
Dept: OUTPATIENT SERVICES | Facility: HOSPITAL | Age: 73
LOS: 1 days | End: 2023-02-13
Payer: MEDICARE

## 2023-02-13 DIAGNOSIS — R13.10 DYSPHAGIA, UNSPECIFIED: ICD-10-CM

## 2023-02-13 PROCEDURE — 88312 SPECIAL STAINS GROUP 1: CPT

## 2023-02-13 PROCEDURE — 43239 EGD BIOPSY SINGLE/MULTIPLE: CPT

## 2023-02-13 PROCEDURE — 88305 TISSUE EXAM BY PATHOLOGIST: CPT | Mod: 26

## 2023-02-13 PROCEDURE — 88312 SPECIAL STAINS GROUP 1: CPT | Mod: 26

## 2023-02-13 PROCEDURE — 88305 TISSUE EXAM BY PATHOLOGIST: CPT

## 2023-02-13 PROCEDURE — 43246 EGD PLACE GASTROSTOMY TUBE: CPT | Mod: 62

## 2023-02-13 PROCEDURE — L8699: CPT

## 2023-02-13 PROCEDURE — 43246 EGD PLACE GASTROSTOMY TUBE: CPT | Mod: XP

## 2023-02-13 DEVICE — KIT ENDO SAFTEY PEG PULL STD 20 FR ENDOVIVE: Type: IMPLANTABLE DEVICE | Status: FUNCTIONAL

## 2023-02-13 DEVICE — ESOPHAGEAL BALLOON CATH CRE FIXED WIRE 6-7-8MM: Type: IMPLANTABLE DEVICE | Status: FUNCTIONAL

## 2023-02-13 DEVICE — PEG SAFETY 20FR PUSH WITH AMP: Type: IMPLANTABLE DEVICE | Status: FUNCTIONAL

## 2023-02-13 DEVICE — PEG KT SAFETY 20FR: Type: IMPLANTABLE DEVICE | Status: FUNCTIONAL

## 2023-02-13 DEVICE — KIT PEG ENDOVIVE ENFIT 20FR PUSH 2/BX: Type: IMPLANTABLE DEVICE | Status: FUNCTIONAL

## 2023-02-13 RX ORDER — VANCOMYCIN HCL 1 G
1000 VIAL (EA) INTRAVENOUS ONCE
Refills: 0 | Status: DISCONTINUED | OUTPATIENT
Start: 2023-02-13 | End: 2023-02-27

## 2023-03-15 ENCOUNTER — APPOINTMENT (OUTPATIENT)
Dept: PULMONOLOGY | Facility: CLINIC | Age: 73
End: 2023-03-15
Payer: MEDICARE

## 2023-03-15 VITALS
HEART RATE: 76 BPM | BODY MASS INDEX: 20.24 KG/M2 | SYSTOLIC BLOOD PRESSURE: 108 MMHG | DIASTOLIC BLOOD PRESSURE: 80 MMHG | HEIGHT: 62 IN | WEIGHT: 110 LBS | OXYGEN SATURATION: 95 % | TEMPERATURE: 97.4 F

## 2023-03-15 PROCEDURE — 99214 OFFICE O/P EST MOD 30 MIN: CPT

## 2023-03-15 NOTE — HISTORY OF PRESENT ILLNESS
[Never] : never [TextBox_4] : Patient is a 72 year old female Hx recently diagnosed ALS, dysphagia, chronic aspiration presents to [facility] for follow up.  Patient now has PEG tube.  She is feeling much stronger.  She does not experience choking or cough.  In addition she has started NIV prn.  She feels much better after using NIV.  She reports she is feeling well and without increased respiratory complaints.

## 2023-03-15 NOTE — ASSESSMENT
[FreeTextEntry1] : 72 year old female Hx ALS, dysphagia, present sfor follow up\par \par Continue NIV\par Continue PEG tube feeds\par Follow up [facility] ALS program\par \par Follow up 3 months

## 2023-03-22 ENCOUNTER — APPOINTMENT (OUTPATIENT)
Dept: ORTHOPEDIC SURGERY | Facility: CLINIC | Age: 73
End: 2023-03-22
Payer: MEDICARE

## 2023-03-22 VITALS — HEART RATE: 81 BPM | SYSTOLIC BLOOD PRESSURE: 95 MMHG | DIASTOLIC BLOOD PRESSURE: 68 MMHG

## 2023-03-22 DIAGNOSIS — M25.559 PAIN IN UNSPECIFIED HIP: ICD-10-CM

## 2023-03-22 PROCEDURE — 73502 X-RAY EXAM HIP UNI 2-3 VIEWS: CPT | Mod: LT

## 2023-03-22 PROCEDURE — 99024 POSTOP FOLLOW-UP VISIT: CPT

## 2023-04-15 ENCOUNTER — NON-APPOINTMENT (OUTPATIENT)
Age: 73
End: 2023-04-15

## 2023-05-22 ENCOUNTER — NON-APPOINTMENT (OUTPATIENT)
Age: 73
End: 2023-05-22

## 2023-05-26 ENCOUNTER — APPOINTMENT (OUTPATIENT)
Dept: PULMONOLOGY | Facility: CLINIC | Age: 73
End: 2023-05-26

## 2023-06-07 ENCOUNTER — APPOINTMENT (OUTPATIENT)
Dept: PULMONOLOGY | Facility: CLINIC | Age: 73
End: 2023-06-07
Payer: MEDICARE

## 2023-06-07 VITALS
HEIGHT: 62 IN | WEIGHT: 98 LBS | SYSTOLIC BLOOD PRESSURE: 96 MMHG | BODY MASS INDEX: 18.03 KG/M2 | OXYGEN SATURATION: 95 % | DIASTOLIC BLOOD PRESSURE: 64 MMHG | HEART RATE: 76 BPM | TEMPERATURE: 95.4 F

## 2023-06-07 DIAGNOSIS — G70.89 OTHER SPECIFIED MYONEURAL DISORDERS: ICD-10-CM

## 2023-06-07 DIAGNOSIS — J99 OTHER SPECIFIED MYONEURAL DISORDERS: ICD-10-CM

## 2023-06-07 DIAGNOSIS — R13.12 DYSPHAGIA, OROPHARYNGEAL PHASE: ICD-10-CM

## 2023-06-07 DIAGNOSIS — T17.908A UNSPECIFIED FOREIGN BODY IN RESPIRATORY TRACT, PART UNSPECIFIED CAUSING OTHER INJURY, INITIAL ENCOUNTER: ICD-10-CM

## 2023-06-07 DIAGNOSIS — J96.10 CHRONIC RESPIRATORY FAILURE, UNSPECIFIED WHETHER WITH HYPOXIA OR HYPERCAPNIA: ICD-10-CM

## 2023-06-07 DIAGNOSIS — G12.21 AMYOTROPHIC LATERAL SCLEROSIS: ICD-10-CM

## 2023-06-07 PROCEDURE — 99214 OFFICE O/P EST MOD 30 MIN: CPT | Mod: 25

## 2023-06-07 NOTE — HISTORY OF PRESENT ILLNESS
[Never] : never [TextBox_4] : Patient is a 72 year old female Hx recently diagnosed ALS, dysphagia, chronic aspiration, s/p PEG tube placement,  presents to [facility] for follow up.  Patient is non verbal and accompanied by caretaker.  Caretaker reports patient treated for pneumonia end May.  she reports she was treated at Urgent Care with 2 courses of antibiotics.  She reports she had a cough.  She is now feeling better and here for follow up.

## 2023-06-07 NOTE — REASON FOR VISIT
[Follow-Up] : a follow-up visit [Dysphagia] : dysphagia [Nocturnal Hypoventilation] : nocturnal hypoventilation [TextBox_44] : ALS/chronic respiratory failure

## 2023-06-07 NOTE — PROCEDURE
[FreeTextEntry1] : PFT 10/13/22 personally reviewed normal spirometry, normal lung volumes, normal DLCO significant decrease in PI PE max indicating severe respiratory muscle weakness.\par \par CXR 5/23/23 personally reviewed clear lungs

## 2023-06-07 NOTE — ASSESSMENT
[FreeTextEntry1] : 72 year old female Hx ALS, nonverbal, dysphagia, chronic aspiration s/p PEG tube placement, recent treatment for URI  presents for follow up\par \par Continue NIV QHS and prn\par Continue nebulized Budesonide and albuterol/Ipratropium twice daily\par Follow up Neurology\par Follow up GI\par \par Follow up 3-4 months \par  \par

## 2023-06-08 LAB
ALBUMIN SERPL ELPH-MCNC: 4.4 G/DL
ALP BLD-CCNC: 91 U/L
ALT SERPL-CCNC: 17 U/L
ANION GAP SERPL CALC-SCNC: 13 MMOL/L
AST SERPL-CCNC: 24 U/L
BILIRUB SERPL-MCNC: 0.3 MG/DL
BUN SERPL-MCNC: 7 MG/DL
CALCIUM SERPL-MCNC: 9.1 MG/DL
CHLORIDE SERPL-SCNC: 102 MMOL/L
CO2 SERPL-SCNC: 27 MMOL/L
CREAT SERPL-MCNC: 0.5 MG/DL
EGFR: 101 ML/MIN/1.73M2
GLUCOSE SERPL-MCNC: 78 MG/DL
POTASSIUM SERPL-SCNC: 4.2 MMOL/L
PROT SERPL-MCNC: 6.7 G/DL
SODIUM SERPL-SCNC: 142 MMOL/L

## 2023-06-23 ENCOUNTER — INPATIENT (INPATIENT)
Facility: HOSPITAL | Age: 73
LOS: 3 days | Discharge: HOME | DRG: 394 | End: 2023-06-27
Attending: STUDENT IN AN ORGANIZED HEALTH CARE EDUCATION/TRAINING PROGRAM | Admitting: INTERNAL MEDICINE
Payer: MEDICARE

## 2023-06-23 VITALS
SYSTOLIC BLOOD PRESSURE: 110 MMHG | OXYGEN SATURATION: 96 % | DIASTOLIC BLOOD PRESSURE: 73 MMHG | TEMPERATURE: 98 F | RESPIRATION RATE: 19 BRPM | HEART RATE: 67 BPM | WEIGHT: 98.11 LBS | HEIGHT: 60 IN

## 2023-06-23 DIAGNOSIS — Z93.1 GASTROSTOMY STATUS: Chronic | ICD-10-CM

## 2023-06-23 DIAGNOSIS — K94.22 GASTROSTOMY INFECTION: ICD-10-CM

## 2023-06-23 DIAGNOSIS — Z98.890 OTHER SPECIFIED POSTPROCEDURAL STATES: Chronic | ICD-10-CM

## 2023-06-23 LAB
ALBUMIN SERPL ELPH-MCNC: 3.5 G/DL — SIGNIFICANT CHANGE UP (ref 3.3–5)
ALP SERPL-CCNC: 83 U/L — SIGNIFICANT CHANGE UP (ref 40–120)
ALT FLD-CCNC: 29 U/L — SIGNIFICANT CHANGE UP (ref 10–45)
ANION GAP SERPL CALC-SCNC: 4 MMOL/L — LOW (ref 5–17)
APPEARANCE UR: CLEAR — SIGNIFICANT CHANGE UP
AST SERPL-CCNC: 41 U/L — HIGH (ref 10–40)
BASOPHILS # BLD AUTO: 0.03 K/UL — SIGNIFICANT CHANGE UP (ref 0–0.2)
BASOPHILS NFR BLD AUTO: 1.1 % — SIGNIFICANT CHANGE UP (ref 0–2)
BILIRUB SERPL-MCNC: 0.3 MG/DL — SIGNIFICANT CHANGE UP (ref 0.2–1.2)
BILIRUB UR-MCNC: NEGATIVE — SIGNIFICANT CHANGE UP
BUN SERPL-MCNC: 11 MG/DL — SIGNIFICANT CHANGE UP (ref 7–23)
CALCIUM SERPL-MCNC: 8.5 MG/DL — SIGNIFICANT CHANGE UP (ref 8.4–10.5)
CHLORIDE SERPL-SCNC: 105 MMOL/L — SIGNIFICANT CHANGE UP (ref 96–108)
CO2 SERPL-SCNC: 32 MMOL/L — HIGH (ref 22–31)
COLOR SPEC: YELLOW — SIGNIFICANT CHANGE UP
CREAT SERPL-MCNC: 0.49 MG/DL — LOW (ref 0.5–1.3)
DIFF PNL FLD: NEGATIVE — SIGNIFICANT CHANGE UP
EGFR: 101 ML/MIN/1.73M2 — SIGNIFICANT CHANGE UP
EOSINOPHIL # BLD AUTO: 0.11 K/UL — SIGNIFICANT CHANGE UP (ref 0–0.5)
EOSINOPHIL NFR BLD AUTO: 4 % — SIGNIFICANT CHANGE UP (ref 0–6)
GLUCOSE SERPL-MCNC: 96 MG/DL — SIGNIFICANT CHANGE UP (ref 70–99)
GLUCOSE UR QL: NEGATIVE MG/DL — SIGNIFICANT CHANGE UP
HCT VFR BLD CALC: 33.9 % — LOW (ref 34.5–45)
HGB BLD-MCNC: 11.5 G/DL — SIGNIFICANT CHANGE UP (ref 11.5–15.5)
IMM GRANULOCYTES NFR BLD AUTO: 0 % — SIGNIFICANT CHANGE UP (ref 0–0.9)
KETONES UR-MCNC: NEGATIVE MG/DL — SIGNIFICANT CHANGE UP
LEUKOCYTE ESTERASE UR-ACNC: NEGATIVE — SIGNIFICANT CHANGE UP
LYMPHOCYTES # BLD AUTO: 0.99 K/UL — LOW (ref 1–3.3)
LYMPHOCYTES # BLD AUTO: 36.4 % — SIGNIFICANT CHANGE UP (ref 13–44)
MCHC RBC-ENTMCNC: 31.5 PG — SIGNIFICANT CHANGE UP (ref 27–34)
MCHC RBC-ENTMCNC: 33.9 GM/DL — SIGNIFICANT CHANGE UP (ref 32–36)
MCV RBC AUTO: 92.9 FL — SIGNIFICANT CHANGE UP (ref 80–100)
MONOCYTES # BLD AUTO: 0.21 K/UL — SIGNIFICANT CHANGE UP (ref 0–0.9)
MONOCYTES NFR BLD AUTO: 7.7 % — SIGNIFICANT CHANGE UP (ref 2–14)
NEUTROPHILS # BLD AUTO: 1.38 K/UL — LOW (ref 1.8–7.4)
NEUTROPHILS NFR BLD AUTO: 50.8 % — SIGNIFICANT CHANGE UP (ref 43–77)
NITRITE UR-MCNC: NEGATIVE — SIGNIFICANT CHANGE UP
NRBC # BLD: 0 /100 WBCS — SIGNIFICANT CHANGE UP (ref 0–0)
PH UR: 7.5 — SIGNIFICANT CHANGE UP (ref 5–8)
PLATELET # BLD AUTO: 170 K/UL — SIGNIFICANT CHANGE UP (ref 150–400)
POTASSIUM SERPL-MCNC: 4.4 MMOL/L — SIGNIFICANT CHANGE UP (ref 3.5–5.3)
POTASSIUM SERPL-SCNC: 4.4 MMOL/L — SIGNIFICANT CHANGE UP (ref 3.5–5.3)
PROT SERPL-MCNC: 6.5 G/DL — SIGNIFICANT CHANGE UP (ref 6–8.3)
PROT UR-MCNC: NEGATIVE MG/DL — SIGNIFICANT CHANGE UP
RBC # BLD: 3.65 M/UL — LOW (ref 3.8–5.2)
RBC # FLD: 13.9 % — SIGNIFICANT CHANGE UP (ref 10.3–14.5)
SODIUM SERPL-SCNC: 141 MMOL/L — SIGNIFICANT CHANGE UP (ref 135–145)
SP GR SPEC: 1 — LOW (ref 1–1.03)
UROBILINOGEN FLD QL: 0.2 MG/DL — SIGNIFICANT CHANGE UP (ref 0.2–1)
WBC # BLD: 2.72 K/UL — LOW (ref 3.8–10.5)
WBC # FLD AUTO: 2.72 K/UL — LOW (ref 3.8–10.5)

## 2023-06-23 PROCEDURE — 99222 1ST HOSP IP/OBS MODERATE 55: CPT

## 2023-06-23 PROCEDURE — 93010 ELECTROCARDIOGRAM REPORT: CPT

## 2023-06-23 PROCEDURE — 99285 EMERGENCY DEPT VISIT HI MDM: CPT | Mod: FS

## 2023-06-23 PROCEDURE — 71045 X-RAY EXAM CHEST 1 VIEW: CPT | Mod: 26

## 2023-06-23 PROCEDURE — 74177 CT ABD & PELVIS W/CONTRAST: CPT | Mod: 26,MA

## 2023-06-23 RX ORDER — AZTREONAM 2 G
1000 VIAL (EA) INJECTION ONCE
Refills: 0 | Status: COMPLETED | OUTPATIENT
Start: 2023-06-23 | End: 2023-06-23

## 2023-06-23 RX ORDER — METRONIDAZOLE 500 MG
500 TABLET ORAL EVERY 8 HOURS
Refills: 0 | Status: DISCONTINUED | OUTPATIENT
Start: 2023-06-23 | End: 2023-06-27

## 2023-06-23 RX ORDER — SODIUM PHENYLBUTYRATE/TAURURSODIOL 3; 1 G/1; G/1
1 POWDER, FOR SUSPENSION ORAL
Refills: 0 | DISCHARGE

## 2023-06-23 RX ORDER — SODIUM CHLORIDE 9 MG/ML
500 INJECTION INTRAMUSCULAR; INTRAVENOUS; SUBCUTANEOUS ONCE
Refills: 0 | Status: COMPLETED | OUTPATIENT
Start: 2023-06-23 | End: 2023-06-23

## 2023-06-23 RX ORDER — AZTREONAM 2 G
1000 VIAL (EA) INJECTION EVERY 8 HOURS
Refills: 0 | Status: DISCONTINUED | OUTPATIENT
Start: 2023-06-23 | End: 2023-06-27

## 2023-06-23 RX ORDER — VANCOMYCIN HCL 1 G
500 VIAL (EA) INTRAVENOUS EVERY 12 HOURS
Refills: 0 | Status: DISCONTINUED | OUTPATIENT
Start: 2023-06-23 | End: 2023-06-27

## 2023-06-23 RX ADMIN — SODIUM CHLORIDE 1000 MILLILITER(S): 9 INJECTION INTRAMUSCULAR; INTRAVENOUS; SUBCUTANEOUS at 18:30

## 2023-06-23 RX ADMIN — Medication 100 MILLIGRAM(S): at 20:52

## 2023-06-23 RX ADMIN — Medication 0.5 MILLIGRAM(S): at 23:02

## 2023-06-23 RX ADMIN — SODIUM CHLORIDE 500 MILLILITER(S): 9 INJECTION INTRAMUSCULAR; INTRAVENOUS; SUBCUTANEOUS at 19:00

## 2023-06-23 RX ADMIN — Medication 100 MILLIGRAM(S): at 23:09

## 2023-06-23 RX ADMIN — Medication 1000 MILLIGRAM(S): at 19:31

## 2023-06-23 RX ADMIN — Medication 50 MILLIGRAM(S): at 18:31

## 2023-06-23 NOTE — ED PROVIDER NOTE - PROGRESS NOTE DETAILS
DIEGO Malik: Pt s/o DIEGO kaur to f/u labs, abd CT and dispo, if patient being dc'd she should go home on PO abx

## 2023-06-23 NOTE — ED ADULT NURSE NOTE - OBJECTIVE STATEMENT
Patient came from home with complaint of redness PEG tube site. Patient denies any recent fever, chills or headache. Patient has a PMH of ALS and is wheelchair bound.

## 2023-06-23 NOTE — H&P ADULT - ASSESSMENT
72F hx of ALS (on riluzole and on drug trial with Relyvrio baseline can walk with cane assisted but needs help with grooming feeding, nonverbal and communicates with device), PEG pw PEG site infection.    # Peg site infection with ?phlegmnon/abscess  IV Vanco and Aztreonam/Flagyl.  D/W Brunilda, Surgery aware and to admit for IV antibxs and keep NPO    #ALS  con riluzole    Son Chavez at bedside updated. [phone] 72F hx of ALS (on riluzole and on drug trial with Relyvrio baseline can walk with cane assisted but needs help with grooming feeding, nonverbal and communicates with device), PEG pw PEG site infection.    # Peg site infection with ?phlegmnon/abscess  IV Vanco and Aztreonam/Flagyl.  D/W Brunilda, Surgery aware and to admit for IV antibxs and keep NPO    #ALS  con riluzole    Son Chavez at bedside updated. [phone]    on IVAPS intermittently to prevent atelectasis  Vt: 444 EPAP: 5-15, PS: 5-20

## 2023-06-23 NOTE — ED ADULT NURSE NOTE - NSFALLHARMRISKINTERV_ED_ALL_ED

## 2023-06-23 NOTE — H&P ADULT - NSHPREVIEWOFSYSTEMS_GEN_ALL_CORE
CONSTITUTIONAL: No fever, weight loss, + fatigue  EYES: No eye pain, visual disturbances, or discharge  ENMT:  No difficulty hearing, tinnitus, vertigo; No sinus or throat pain  NECK: No pain or stiffness  RESPIRATORY: No cough, wheezing, chills or hemoptysis; No shortness of breath  CARDIOVASCULAR: No chest pain, palpitations, dizziness, or leg swelling  GASTROINTESTINAL: No abdominal or epigastric pain. No nausea, vomiting, or hematemesis; No diarrhea or constipation. No melena or hematochezia.  GENITOURINARY: No dysuria, frequency, hematuria, or incontinence  NEUROLOGICAL: No headaches, memory loss, loss of strength, numbness, or tremors  SKIN: No itching, burning, rashes, or lesions   LYMPH NODES: No enlarged glands  ENDOCRINE: No heat or cold intolerance; No hair loss  MUSCULOSKELETAL: No joint pain or swelling; No muscle, back, or extremity pain  PSYCHIATRIC: No depression, anxiety, mood swings, or difficulty sleeping  HEME/LYMPH: No easy bruising, or bleeding gums  ALLERY AND IMMUNOLOGIC: No hives or eczema    IMPROVE VTE Individual Risk Assessment          RISK                                                          Points  [  ] Previous VTE                                                3  [  ] Thrombophilia                                             2  [  ] Lower limb paralysis                                   2        (unable to hold up >15 seconds)    [  ] Current Cancer                                             2         (within 6 months)  [  ] Immobilization > 24 hrs                              1  [  ] ICU/CCU stay > 24 hours                             1  [  ] Age > 60                                                         1    IMPROVE VTE Score:         [         ]    Total Risk Factor Score:    0 - 1:   Consider IPC  >2 - 3:  Thromboprophylaxis required (enoxaparin or SQ heparin)        >4:   High Risk: Thromboprophylaxis required (enoxaparin or SQ heparin), optional add IPC  **If CONTRAINDICATION to enoxaparin or SQ heparin, USE IPCs**

## 2023-06-23 NOTE — CHART NOTE - NSCHARTNOTEFT_GEN_A_CORE
Search Terms: vito barrett, 1950Search Date: 06/23/2023 21:00:17 PM  The Drug Utilization Report below displays all of the controlled substance prescriptions, if any, that your patient has filled in the last twelve months. The information displayed on this report is compiled from pharmacy submissions to the Department, and accurately reflects the information as submitted by the pharmacies.    This report was requested by: Maria E Kendrick | Reference #: [id number]    You have not added a MYA number. Keeping your MYA number(s) up to date on the My MYA # tab will enable the separation of your prescriptions from others in the search results.    Practitioner Count: 2  Pharmacy Count: 1  Current Opioid Prescriptions: 0  Current Benzodiazepine Prescriptions: 1  Current Stimulant Prescriptions: 0      Patient Demographic Information (PDI)       PDI	First Name	Last Name	Birth Date	Gender	Street Address	[facility] Code  A	Vito Barrett	1950	Female	[address] [address]    Prescription Information      PDI Filter:    PDI	Current Rx	Drug Type	Rx Written	Rx Dispensed	Drug	Quantity	Days Supply  A	Y	B	05/01/2023	06/01/2023	lorazepam 0.5 mg tablet	30	30  Prescriber Name Lea Short MD  Prescriber MYA # [id number]  Payment Method Medicare Dispenser [facility] #[id number]  A	N	B	05/01/2023	05/02/2023	lorazepam 0.5 mg tablet	30	30  Prescriber Name Checo Roberts  Prescriber MYA # [id number]  Payment Method Medicare Dispenser [facility] #[id number]  A	N	B	04/02/2023	04/02/2023	lorazepam 0.5 mg tablet	30	30  Prescriber Name Checo Roberts  Prescriber MYA # [id number]  Payment Method Medicare Dispenser [facility] #[id number]  A	N	B	02/22/2023	03/02/2023	lorazepam 0.5 mg tablet	30	30  Prescriber Name Checo Roberts  Prescriber MYA # [id number]  Payment Method Medicare Dispenser [facility] #[id number]  A	N	B	01/24/2023	02/02/2023	lorazepam 0.5 mg tablet	30	30  Prescriber Name Checo Roberts  Prescriber MYA # [id number]  Payment Method Medicare Dispenser [facility] #[id number]  A	N	B	01/05/2023	01/06/2023	lorazepam 0.5 mg tablet	30	30  Prescriber Name Lea Short MD  Prescriber MYA # [id number]  Payment Method Medicare  Dispenser [facility] #[id number]

## 2023-06-23 NOTE — ED ADULT NURSE NOTE - CHIEF COMPLAINT QUOTE
Patient brought in by son c/o drainage and pus and redness around PEG tube. Patient denies chest pain, SOB, headache, dizziness and blurry vision.

## 2023-06-23 NOTE — ED PROVIDER NOTE - PHYSICAL EXAMINATION
Gen: Well appearing in NAD.   ENT: oral mucosa moist   Head: atraumatic  Heart: s1/s2, RRR  Lung: CTA b/l,  Abd: soft, NT/ND, no rebound or guarding. +PEG tube noted with mild erythema around the insertion site and purulent drainage at the site. No warmth or TTP.   Msk: no pedal edema  Neuro: AAO x3,   Skin: Normal for race  Psych: Alert and oriented

## 2023-06-23 NOTE — H&P ADULT - NSHPPHYSICALEXAM_GEN_ALL_CORE
Vital Signs Last 24 Hrs  T(C): 37.3 (23 Jun 2023 17:40), Max: 37.3 (23 Jun 2023 17:40)  T(F): 99.2 (23 Jun 2023 17:40), Max: 99.2 (23 Jun 2023 17:40)  HR: 75 (23 Jun 2023 20:00) (67 - 75)  BP: 115/83 (23 Jun 2023 20:00) (110/73 - 115/83)  BP(mean): --  RR: 18 (23 Jun 2023 20:00) (18 - 19)  SpO2: 98% (23 Jun 2023 20:00) (96% - 98%)    Parameters below as of 23 Jun 2023 20:00  Patient On (Oxygen Delivery Method): room air      Daily Height in cm: 152.4 (23 Jun 2023 16:38)    Daily   CAPILLARY BLOOD GLUCOSE        I&O's Summary      GENERAL: NAD, nonverbal but communicative on device  HEAD:  Normocephalic  EYES: EOMI, PERRLA, conjunctiva and sclera clear  ENMT: No tonsillar erythema, exudates, or enlargement; Moist mucous membranes, No lesions  NECK: Supple, No JVD, no bruit, normal thyroid  NERVOUS SYSTEM:  Alert & Oriented X3,grossly moves all fours.   CHEST/LUNG: Clear to auscultation bilaterally; No rales, rhonchi, wheezing, or rubs  HEART: Regular rate and rhythm; No murmurs, rubs, or gallops  ABDOMEN: Soft, Nontender, Nondistended; Bowel sounds present.   EXTREMITIES:  2+ Peripheral Pulses, No clubbing, cyanosis, or edema  LYMPH: No lymphadenopathy noted  SKIN: No rashes or lesions Vital Signs Last 24 Hrs  T(C): 37.3 (23 Jun 2023 17:40), Max: 37.3 (23 Jun 2023 17:40)  T(F): 99.2 (23 Jun 2023 17:40), Max: 99.2 (23 Jun 2023 17:40)  HR: 75 (23 Jun 2023 20:00) (67 - 75)  BP: 115/83 (23 Jun 2023 20:00) (110/73 - 115/83)  BP(mean): --  RR: 18 (23 Jun 2023 20:00) (18 - 19)  SpO2: 98% (23 Jun 2023 20:00) (96% - 98%)    Parameters below as of 23 Jun 2023 20:00  Patient On (Oxygen Delivery Method): room air      Daily Height in cm: 152.4 (23 Jun 2023 16:38)    Daily   CAPILLARY BLOOD GLUCOSE        I&O's Summary      GENERAL: NAD, nonverbal but communicative on device  HEAD:  Normocephalic  EYES: EOMI, PERRLA, conjunctiva and sclera clear  ENMT: No tonsillar erythema, exudates, or enlargement; Moist mucous membranes, No lesions  NECK: Supple, No JVD, no bruit, normal thyroid  NERVOUS SYSTEM:  Alert & Oriented X3,grossly moves all fours.   CHEST/LUNG: Clear to auscultation bilaterally; No rales, rhonchi, wheezing, or rubs  HEART: Regular rate and rhythm; No murmurs, rubs, or gallops  ABDOMEN: Soft, Nontender, Nondistended; +PEG - surrounding mild erythema with some purulent drainage.  Bowel sounds present.   EXTREMITIES:  2+ Peripheral Pulses, No clubbing, cyanosis, or edema  LYMPH: No lymphadenopathy noted  SKIN: No rashes or lesions

## 2023-06-23 NOTE — ED PROVIDER NOTE - CLINICAL SUMMARY MEDICAL DECISION MAKING FREE TEXT BOX
72-year-old female past medical history of ALS wheelchair-bound, PEG tube  was brought in by her son for generalized weakness and decreased appetite x4 days.  Patient's aide noticed redness around the PEG tube insertion site with some pus drainage X 2 days.  No fever chills, abdominal pain, cough, chest pain, shortness of breath or urinary symptoms. PE as noted above. labs pending. Abd CT pending. CXR. IVF hydration, reassess 72-year-old female past medical history of ALS wheelchair-bound, PEG tube  was brought in by her son for generalized weakness and decreased appetite x4 days.  Patient's aide noticed redness around the PEG tube insertion site with some pus drainage X 2 days.  No fever chills, abdominal pain, cough, chest pain, shortness of breath or urinary symptoms. PE as noted above. rectal temp 99.2. labs pending. Abd CT pending. CXR. IVF hydration, reassess 72-year-old female past medical history of ALS wheelchair-bound, PEG tube  was brought in by her son for generalized weakness and decreased appetite x4 days.  Patient's aide noticed redness around the PEG tube insertion site with some pus drainage X 2 days.  No fever chills, abdominal pain, cough, chest pain, shortness of breath or urinary symptoms. PE as noted above. rectal temp 99.2. labs pending. Abd CT pending. CXR. IVF hydration, reassess    ct shows peg infection. spoke to dr. ramirez. no feeds though peg and abx. added clinda to aztreonam. will admit 72-year-old female past medical history of ALS wheelchair-bound, PEG tube  was brought in by her son for generalized weakness and decreased appetite x4 days.  Patient's aide noticed redness around the PEG tube insertion site with some pus drainage X 2 days.  No fever chills, abdominal pain, cough, chest pain, shortness of breath or urinary symptoms. PE as noted above. rectal temp 99.2. labs pending. Abd CT pending. CXR. IVF hydration, reassess    ct shows peg infection. spoke to dr. ramirez. no feeds though peg and abx. added clinda to aztreonam. will admit    DT: I have personally performed a face to face diagnostic evaluation on this patient.  I have reviewed the PA's note and agree with the history, exam, and plan of care, except as noted.  History and Exam by me shows 72-year-old female past medical history of ALS wheelchair-bound, PEG tube  was brought in by her son for generalized weakness and decreased appetite x4 days.  Patient's aide noticed redness around the PEG tube insertion site with some pus drainage X 2 days.  No fever chills, abdominal pain, cough, chest pain, shortness of breath or urinary symptoms. .  Patient is NAD.  A n O x 3. Head NC/AT. Lungs cta bl. Heart s1,s2, rrr, no murmurs. Abd-soft, nt, no guarding, no rebound, no distension, no cva tenderness. Ext- FROM actively.  Labs were unremarkable signed out Dr. Dorado pending ct

## 2023-06-23 NOTE — ED PROVIDER NOTE - NS ED ATTENDING STATEMENT MOD
This was a shared visit with the SIMRAN. I reviewed and verified the documentation and independently performed the documented:

## 2023-06-23 NOTE — ED PROVIDER NOTE - OBJECTIVE STATEMENT
72-year-old female past medical history of ALS wheelchair-bound, PEG tube  was brought in by her son for generalized weakness and decreased appetite x4 days.  Patient's aide noticed redness around the PEG tube insertion site with some pus drainage X 2 days.  No fever chills, abdominal pain, cough, chest pain, shortness of breath or urinary symptoms.

## 2023-06-23 NOTE — H&P ADULT - HISTORY OF PRESENT ILLNESS
72F hx of ALS (on riluzole and on drug trial with Relyvrio baseline can walk with cane assisted but needs help with grooming feeding, nonverbal and communicates with device), PEG pw PEG site infection. Per son Chavez at bedside, pt has been more tired this week and several days ago noted by aide to have some erythema and drainage at the PEG site. Pt denied any local tenderness, fevers, chills, cough, abd pain or dysuria. In ED, Tmax 99.2 P: 67 BP: 110/73 sat 96% on RA. WBC: 2.72 51%N cr: 0.49 UA neg.   CTAP:  rad< from: CT Abdomen and Pelvis w/ IV Cont (06.23.23 @ 19:00) >  IMPRESSION:    Gastrostomy tube bumper localized to the stomach. Prominent inflammatory   changes and edema associated with the gastrostomy tube tract along the   abdominal wall. Focal fluid along the abdominal wall adjacent to the tube   measures 11 mm on image 37 series 2. Phlegmonous change versus early   abscess can be considered given the concern for infection. Follow-up to   resolution.    < end of copied text >

## 2023-06-23 NOTE — H&P ADULT - NSHPLABSRESULTS_GEN_ALL_CORE
11.5   2.72  )-----------( 170      ( 2023 17:40 )             33.9           141  |  105  |  11  ----------------------------<  96  4.4   |  32<H>  |  0.49<L>    Ca    8.5      2023 17:40    TPro  6.5  /  Alb  3.5  /  TBili  0.3  /  DBili  x   /  AST  41<H>  /  ALT  29  /  AlkPhos  83           LIVER FUNCTIONS - ( 2023 17:40 )  Alb: 3.5 g/dL / Pro: 6.5 g/dL / ALK PHOS: 83 U/L / ALT: 29 U/L / AST: 41 U/L / GGT: x                         Urinalysis Basic - ( 2023 17:40 )    Color: Yellow / Appearance: Clear / S.004 / pH: x  Gluc: 96 mg/dL / Ketone: Negative mg/dL  / Bili: Negative / Urobili: 0.2 mg/dL   Blood: x / Protein: Negative mg/dL / Nitrite: Negative   Leuk Esterase: Negative / RBC: x / WBC x   Sq Epi: x / Non Sq Epi: x / Bacteria: x        CAPILLARY BLOOD GLUCOSE            EKG: prsonally rev. NSR at 65bpm no acute ST changes      CXR: wet read. NAPD    rad< from: CT Abdomen and Pelvis w/ IV Cont (23 @ 19:00) >    IMPRESSION:    Gastrostomy tube bumper localized to the stomach. Prominent inflammatory   changes and edema associated with the gastrostomy tube tract along the   abdominal wall. Focal fluid along the abdominal wall adjacent to the tube   measures 11 mm on image 37 series 2. Phlegmonous change versus early   abscess can be considered given the concern for infection. Follow-up to   resolution.    < end of copied text >

## 2023-06-23 NOTE — H&P ADULT - NSICDXPASTSURGICALHX_GEN_ALL_CORE_FT
PAST SURGICAL HISTORY:  History of hip surgery     PEG (percutaneous endoscopic gastrostomy) status

## 2023-06-23 NOTE — ED ADULT TRIAGE NOTE - CHIEF COMPLAINT QUOTE
Patient brought in by son c/o drainage and pus around PEG tube site. Patient denies chest pain, SOB, headache, dizziness and blurry vision. Patient brought in by son c/o drainage and pus and redness around PEG tube. Patient denies chest pain, SOB, headache, dizziness and blurry vision.

## 2023-06-24 ENCOUNTER — TRANSCRIPTION ENCOUNTER (OUTPATIENT)
Age: 73
End: 2023-06-24

## 2023-06-24 LAB
ALBUMIN SERPL ELPH-MCNC: 3 G/DL — LOW (ref 3.3–5)
ALP SERPL-CCNC: 68 U/L — SIGNIFICANT CHANGE UP (ref 40–120)
ALT FLD-CCNC: 22 U/L — SIGNIFICANT CHANGE UP (ref 10–45)
ANION GAP SERPL CALC-SCNC: 8 MMOL/L — SIGNIFICANT CHANGE UP (ref 5–17)
AST SERPL-CCNC: 20 U/L — SIGNIFICANT CHANGE UP (ref 10–40)
BASOPHILS # BLD AUTO: 0.03 K/UL — SIGNIFICANT CHANGE UP (ref 0–0.2)
BASOPHILS NFR BLD AUTO: 1.1 % — SIGNIFICANT CHANGE UP (ref 0–2)
BILIRUB SERPL-MCNC: 0.4 MG/DL — SIGNIFICANT CHANGE UP (ref 0.2–1.2)
BUN SERPL-MCNC: 9 MG/DL — SIGNIFICANT CHANGE UP (ref 7–23)
CALCIUM SERPL-MCNC: 8.3 MG/DL — LOW (ref 8.4–10.5)
CHLORIDE SERPL-SCNC: 107 MMOL/L — SIGNIFICANT CHANGE UP (ref 96–108)
CO2 SERPL-SCNC: 27 MMOL/L — SIGNIFICANT CHANGE UP (ref 22–31)
CREAT SERPL-MCNC: 0.37 MG/DL — LOW (ref 0.5–1.3)
EGFR: 108 ML/MIN/1.73M2 — SIGNIFICANT CHANGE UP
EOSINOPHIL # BLD AUTO: 0.15 K/UL — SIGNIFICANT CHANGE UP (ref 0–0.5)
EOSINOPHIL NFR BLD AUTO: 5.3 % — SIGNIFICANT CHANGE UP (ref 0–6)
GLUCOSE SERPL-MCNC: 91 MG/DL — SIGNIFICANT CHANGE UP (ref 70–99)
HCT VFR BLD CALC: 32.2 % — LOW (ref 34.5–45)
HGB BLD-MCNC: 10.8 G/DL — LOW (ref 11.5–15.5)
IMM GRANULOCYTES NFR BLD AUTO: 0 % — SIGNIFICANT CHANGE UP (ref 0–0.9)
INR BLD: 1.14 RATIO — SIGNIFICANT CHANGE UP (ref 0.88–1.16)
LYMPHOCYTES # BLD AUTO: 1.14 K/UL — SIGNIFICANT CHANGE UP (ref 1–3.3)
LYMPHOCYTES # BLD AUTO: 40.3 % — SIGNIFICANT CHANGE UP (ref 13–44)
MCHC RBC-ENTMCNC: 31.3 PG — SIGNIFICANT CHANGE UP (ref 27–34)
MCHC RBC-ENTMCNC: 33.5 GM/DL — SIGNIFICANT CHANGE UP (ref 32–36)
MCV RBC AUTO: 93.3 FL — SIGNIFICANT CHANGE UP (ref 80–100)
MONOCYTES # BLD AUTO: 0.24 K/UL — SIGNIFICANT CHANGE UP (ref 0–0.9)
MONOCYTES NFR BLD AUTO: 8.5 % — SIGNIFICANT CHANGE UP (ref 2–14)
NEUTROPHILS # BLD AUTO: 1.27 K/UL — LOW (ref 1.8–7.4)
NEUTROPHILS NFR BLD AUTO: 44.8 % — SIGNIFICANT CHANGE UP (ref 43–77)
NRBC # BLD: 0 /100 WBCS — SIGNIFICANT CHANGE UP (ref 0–0)
PLATELET # BLD AUTO: 142 K/UL — LOW (ref 150–400)
POTASSIUM SERPL-MCNC: 3.8 MMOL/L — SIGNIFICANT CHANGE UP (ref 3.5–5.3)
POTASSIUM SERPL-SCNC: 3.8 MMOL/L — SIGNIFICANT CHANGE UP (ref 3.5–5.3)
PROT SERPL-MCNC: 5.7 G/DL — LOW (ref 6–8.3)
PROTHROM AB SERPL-ACNC: 13.3 SEC — SIGNIFICANT CHANGE UP (ref 10.5–13.4)
RBC # BLD: 3.45 M/UL — LOW (ref 3.8–5.2)
RBC # FLD: 14 % — SIGNIFICANT CHANGE UP (ref 10.3–14.5)
SODIUM SERPL-SCNC: 142 MMOL/L — SIGNIFICANT CHANGE UP (ref 135–145)
WBC # BLD: 2.83 K/UL — LOW (ref 3.8–10.5)
WBC # FLD AUTO: 2.83 K/UL — LOW (ref 3.8–10.5)

## 2023-06-24 PROCEDURE — 99222 1ST HOSP IP/OBS MODERATE 55: CPT

## 2023-06-24 PROCEDURE — 99232 SBSQ HOSP IP/OBS MODERATE 35: CPT

## 2023-06-24 RX ORDER — ACETAMINOPHEN 500 MG
675 TABLET ORAL ONCE
Refills: 0 | Status: DISCONTINUED | OUTPATIENT
Start: 2023-06-24 | End: 2023-06-24

## 2023-06-24 RX ORDER — ACETAMINOPHEN 500 MG
675 TABLET ORAL ONCE
Refills: 0 | Status: COMPLETED | OUTPATIENT
Start: 2023-06-24 | End: 2023-06-24

## 2023-06-24 RX ORDER — ACETAMINOPHEN 500 MG
650 TABLET ORAL EVERY 4 HOURS
Refills: 0 | Status: DISCONTINUED | OUTPATIENT
Start: 2023-06-24 | End: 2023-06-26

## 2023-06-24 RX ORDER — SODIUM CHLORIDE 9 MG/ML
500 INJECTION INTRAMUSCULAR; INTRAVENOUS; SUBCUTANEOUS
Refills: 0 | Status: COMPLETED | OUTPATIENT
Start: 2023-06-24 | End: 2023-06-24

## 2023-06-24 RX ADMIN — Medication 50 MILLIGRAM(S): at 06:10

## 2023-06-24 RX ADMIN — Medication 0.5 MILLIGRAM(S): at 22:04

## 2023-06-24 RX ADMIN — Medication 100 MILLIGRAM(S): at 22:04

## 2023-06-24 RX ADMIN — Medication 100 MILLIGRAM(S): at 14:05

## 2023-06-24 RX ADMIN — SODIUM CHLORIDE 50 MILLILITER(S): 9 INJECTION INTRAMUSCULAR; INTRAVENOUS; SUBCUTANEOUS at 14:05

## 2023-06-24 RX ADMIN — Medication 270 MILLIGRAM(S): at 21:06

## 2023-06-24 RX ADMIN — Medication 50 MILLIGRAM(S): at 22:04

## 2023-06-24 RX ADMIN — Medication 100 MILLIGRAM(S): at 06:11

## 2023-06-24 RX ADMIN — Medication 50 MILLIGRAM(S): at 14:05

## 2023-06-24 RX ADMIN — Medication 100 MILLIGRAM(S): at 17:53

## 2023-06-24 RX ADMIN — Medication 100 MILLIGRAM(S): at 06:10

## 2023-06-24 RX ADMIN — Medication 675 MILLIGRAM(S): at 21:58

## 2023-06-24 RX ADMIN — SODIUM CHLORIDE 50 MILLILITER(S): 9 INJECTION INTRAMUSCULAR; INTRAVENOUS; SUBCUTANEOUS at 01:27

## 2023-06-24 NOTE — PROGRESS NOTE ADULT - ASSESSMENT
72F hx of ALS (on riluzole and on drug trial with Relyvrio baseline can walk with cane assisted but needs help with grooming feeding, nonverbal and communicates with device), PEG pw PEG site infection.    # Peg site infection with ?phlegmnon/abscess  -Surgery recommendations appreciated  -No surgical intervention  -Continue IV abx   -Okay to restart tube feeding. Nutritionist consulted  -Monitor    #ALS  con riluzole    Arturo Byrne at bedside updated. [phone]

## 2023-06-24 NOTE — DIETITIAN INITIAL EVALUATION ADULT - OTHER INFO
72F hx of ALS (on riluzole and on drug trial with Relyvrio baseline can walk with cane assisted but needs help with grooming feeding, nonverbal and communicates with device), PEG pw PEG site infection.   Pt currently NPO- cleared to resume PEG feeds at low rate. Weight hx: 125.6lbs (12/29/22), current weight 98.1lbs (6/23). Indicates 27lbs/21% weight loss. NFPE with evidence of severe muscle wasting/fat loss. Recommend initiation of Jevity 1.5 @ 20cc/hr x 24 hrs via pump. Pt may resume bolus feeding regimen per surgery discretion. Suggest puree + moderate thick liquids as able.

## 2023-06-24 NOTE — PATIENT PROFILE ADULT - DO YOU FEEL UNSAFE AT HOME, WORK, OR SCHOOL?
I performed a history and physical exam of the patient and discussed their management with the resident. I reviewed the resident's note and agree with the documented findings and plan of care, lexcept as noted.. My medical decision making and observations are found above.
no

## 2023-06-24 NOTE — CHART NOTE - NSCHARTNOTEFT_GEN_A_CORE
F/U Note:    Patient is a 72y old  Female who presents with a chief complaint of PEG site infection  Interval Hx;    Patient non-verbal, notified by RN that patient communicated via communication board that she has HA and would like Tylenol. Ordered IV Tylenol will reassess patient.     Vital Signs Last 24 Hrs  T(C): 36.5 (24 Jun 2023 16:27), Max: 36.9 (24 Jun 2023 07:30)  T(F): 97.7 (24 Jun 2023 16:27), Max: 98.4 (24 Jun 2023 07:30)  HR: 77 (24 Jun 2023 16:27) (65 - 77)  BP: 123/78 (24 Jun 2023 16:27) (108/62 - 126/60)  BP(mean): --  RR: 18 (24 Jun 2023 16:27) (16 - 18)  SpO2: 96% (24 Jun 2023 16:27) (95% - 99%)    Parameters below as of 24 Jun 2023 15:00  Patient On (Oxygen Delivery Method): room air                                10.8   2.83  )-----------( 142      ( 24 Jun 2023 07:40 )             32.2         06-24    142  |  107  |  9   ----------------------------<  91  3.8   |  27  |  0.37<L>    Ca    8.3<L>      24 Jun 2023 07:40    TPro  5.7<L>  /  Alb  3.0<L>  /  TBili  0.4  /  DBili  x   /  AST  20  /  ALT  22  /  AlkPhos  68  06-24

## 2023-06-24 NOTE — PATIENT PROFILE ADULT - FALL HARM RISK - HARM RISK INTERVENTIONS

## 2023-06-24 NOTE — DIETITIAN INITIAL EVALUATION ADULT - PERTINENT MEDS FT
MEDICATIONS  (STANDING):  aztreonam  IVPB 1000 milliGRAM(s) IV Intermittent every 8 hours  LORazepam     Tablet 0.5 milliGRAM(s) Oral at bedtime  LORazepam   Injectable 0.5 milliGRAM(s) IV Push once  metroNIDAZOLE  IVPB 500 milliGRAM(s) IV Intermittent every 8 hours  sodium chloride 0.9%. 500 milliLiter(s) (50 mL/Hr) IV Continuous <Continuous>  vancomycin  IVPB 500 milliGRAM(s) IV Intermittent every 12 hours    MEDICATIONS  (PRN):

## 2023-06-24 NOTE — DIETITIAN INITIAL EVALUATION ADULT - ADD RECOMMEND
1. Jevity 1.5 @ 20cc/hr x 24 hrs via PEG  2. PO diet: puree with moderate thick as able  3. Resume bolus feeding regimen per surgery discretion

## 2023-06-24 NOTE — ED ADULT NURSE REASSESSMENT NOTE - NS ED NURSE REASSESS COMMENT FT1
Received care of patient. Resting supine in bed, IV antibiotics and fluid bolus running. Both IVs patent, flushed. Offered repositioning. Denies pain. Patient remains NPO.

## 2023-06-24 NOTE — DIETITIAN INITIAL EVALUATION ADULT - PERTINENT LABORATORY DATA
06-24    142  |  107  |  9   ----------------------------<  91  3.8   |  27  |  0.37<L>    Ca    8.3<L>      24 Jun 2023 07:40    TPro  5.7<L>  /  Alb  3.0<L>  /  TBili  0.4  /  DBili  x   /  AST  20  /  ALT  22  /  AlkPhos  68  06-24  A1C with Estimated Average Glucose Result: 5.0 % (12-30-22 @ 06:00)

## 2023-06-24 NOTE — DIETITIAN INITIAL EVALUATION ADULT - ORAL INTAKE PTA/DIET HISTORY
Pt non-verbal, but communicates using writing board. Reports that she was previously on an oral diet + supplemental tube feeds via PEG: puree with moderate thick liquids (soups, puree eggs, cream of wheat + protein powder) + high calorie Boost 2x/day via PEG (530kcals per serving).

## 2023-06-24 NOTE — ED ADULT NURSE REASSESSMENT NOTE - NS ED NURSE REASSESS COMMENT FT1
Hourly rounding performed. Son at bedside, plan discussed. IVF running. Offered turning and positioning, patient declined at this time.
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
## 2023-06-25 LAB
CULTURE RESULTS: SIGNIFICANT CHANGE UP
SPECIMEN SOURCE: SIGNIFICANT CHANGE UP
VANCOMYCIN TROUGH SERPL-MCNC: 2.8 UG/ML — LOW (ref 10–20)
VANCOMYCIN TROUGH SERPL-MCNC: 3.7 UG/ML — LOW (ref 10–20)

## 2023-06-25 PROCEDURE — 99233 SBSQ HOSP IP/OBS HIGH 50: CPT

## 2023-06-25 RX ORDER — ACETAMINOPHEN 500 MG
675 TABLET ORAL ONCE
Refills: 0 | Status: COMPLETED | OUTPATIENT
Start: 2023-06-25 | End: 2023-06-25

## 2023-06-25 RX ADMIN — Medication 50 MILLIGRAM(S): at 06:05

## 2023-06-25 RX ADMIN — Medication 50 MILLIGRAM(S): at 23:00

## 2023-06-25 RX ADMIN — Medication 100 MILLIGRAM(S): at 23:00

## 2023-06-25 RX ADMIN — Medication 270 MILLIGRAM(S): at 23:00

## 2023-06-25 RX ADMIN — Medication 50 MILLIGRAM(S): at 13:46

## 2023-06-25 RX ADMIN — Medication 0.5 MILLIGRAM(S): at 23:40

## 2023-06-25 RX ADMIN — Medication 100 MILLIGRAM(S): at 14:48

## 2023-06-25 RX ADMIN — Medication 100 MILLIGRAM(S): at 09:10

## 2023-06-25 RX ADMIN — Medication 100 MILLIGRAM(S): at 22:13

## 2023-06-25 RX ADMIN — Medication 100 MILLIGRAM(S): at 06:05

## 2023-06-25 RX ADMIN — Medication 675 MILLIGRAM(S): at 23:30

## 2023-06-25 NOTE — CONSULT NOTE ADULT - SUBJECTIVE AND OBJECTIVE BOX
HPI:   Patient is a 72y female with a history of ALS on experimental treatment, s/p placement of G tube in March of 2023, who has been feeling week x past week and noted some pain and drainage around G tube site and was sent to ER for admission.She was admitted 6/23. No fever. G tube has been functioning. She was placed on aztreonam.metronidazole/vancomycin.  Her CT scan did not show a drainable collection. General surgery thought that perhaps there was some gastric leakage around G tube with inflammatory changes seen.She reportedly takes Riluzulo and Relyvrio for ALS.    REVIEW OF SYSTEMS:  All other review of systems negative (Comprehensive ROS)    PAST MEDICAL & SURGICAL HISTORY:  ALS (amyotrophic lateral sclerosis)      High cholesterol      History of hip surgery      PEG (percutaneous endoscopic gastrostomy) status          Allergies    penicillin (Unknown)Facial swelling    Intolerances        Antimicrobials Day #  :day 3  aztreonam  IVPB 1000 milliGRAM(s) IV Intermittent every 8 hours  metroNIDAZOLE  IVPB 500 milliGRAM(s) IV Intermittent every 8 hours  vancomycin  IVPB 500 milliGRAM(s) IV Intermittent every 12 hours    Other Medications:  acetaminophen     Tablet .. 650 milliGRAM(s) Oral every 4 hours PRN  LORazepam     Tablet 0.5 milliGRAM(s) Oral at bedtime      FAMILY HISTORY:  FH: ALS (amyotrophic lateral sclerosis)        SOCIAL HISTORY:  Smoking:  x   ETOH:  x   Drug Use: x    Single     T(F): 98 (06-25-23 @ 09:10), Max: 98 (06-25-23 @ 09:10)  HR: 66 (06-25-23 @ 09:10)  BP: 108/71 (06-25-23 @ 09:10)  RR: 16 (06-25-23 @ 09:10)  SpO2: 95% (06-25-23 @ 09:10)  Wt(kg): --    PHYSICAL EXAM:  General: alert, no acute distress  Eyes:  anicteric, no conjunctival injection, no discharge  Oropharynx: no lesions or injection 	  Neck: supple, without adenopathy  Lungs: clear to auscultation  Heart: regular rate and rhythm; no murmur, rubs or gallops  Abdomen: soft, nondistended, nontender, without mass or organomegaly, G tube exit without any erythema or drainage  Skin: no lesions  Extremities: no clubbing, cyanosis, or edema  Neurologic: alert, oriented, functional paraplegia    LAB RESULTS:                        10.8   2.83  )-----------( 142      ( 24 Jun 2023 07:40 )             32.2     06-24    142  |  107  |  9   ----------------------------<  91  3.8   |  27  |  0.37<L>    Ca    8.3<L>      24 Jun 2023 07:40    TPro  5.7<L>  /  Alb  3.0<L>  /  TBili  0.4  /  DBili  x   /  AST  20  /  ALT  22  /  AlkPhos  68  06-24    LIVER FUNCTIONS - ( 24 Jun 2023 07:40 )  Alb: 3.0 g/dL / Pro: 5.7 g/dL / ALK PHOS: 68 U/L / ALT: 22 U/L / AST: 20 U/L / GGT: x           Urinalysis Basic - ( 24 Jun 2023 07:40 )    Color: x / Appearance: x / SG: x / pH: x  Gluc: 91 mg/dL / Ketone: x  / Bili: x / Urobili: x   Blood: x / Protein: x / Nitrite: x   Leuk Esterase: x / RBC: x / WBC x   Sq Epi: x / Non Sq Epi: x / Bacteria: x        MICROBIOLOGY:  RECENT CULTURES:        RADIOLOGY REVIEWED:  < from: CT Abdomen and Pelvis w/ IV Cont (06.23.23 @ 19:00) >  IMPRESSION:    Gastrostomy tube bumper localized to the stomach. Prominent inflammatory   changes and edema associated with the gastrostomy tube tract along the   abdominal wall. Focal fluid along the abdominal wall adjacent to the tube   measures 11 mm on image 37 series 2. Phlegmonous change versus early   abscess can be considered given the concern for infection. Follow-up to   resolution.    --- End of Report ---    < end of copied text >  
This 72 year old woman, with ALS, was brought to the ED by her son because of a possible infection around a PEG tube site. The patient walks with the assistance of a cane and only communicates with a hand held e-pad. Reportedly, the patient has been lethargic for the past several days and was noted by her aid that the skin around the PEG site was red and there was intermittent  drainage of "pus". A CT scan, last night, demonstrated slight inflammatory changes in the abdominal wall and a very small fluid collection. The patient was afebrile with a low WBC (2.83).      PAST MEDICAL & SURGICAL HISTORY:   Hip surgery  PEG  ALS    PFSH: All noncontributory    MEDICATIONS REVIEWED:aztreonam  IVPB 1000 milliGRAM(s) IV Intermittent every 8 hours  LORazepam     Tablet 0.5 milliGRAM(s) Oral at bedtime  LORazepam   Injectable 0.5 milliGRAM(s) IV Push once  metroNIDAZOLE  IVPB 500 milliGRAM(s) IV Intermittent every 8 hours  sodium chloride 0.9%. 500 milliLiter(s) IV Continuous <Continuous>  vancomycin  IVPB 500 milliGRAM(s) IV Intermittent every 12 hours      ALLERGIES:penicillin (Unknown)      REVIEW OF SYSTEMS:  Comprehensive Review of Systems negative except as noted in HPI      PHYSICAL EXAMINATION:  RESPIRATORY: Clear to auscultation bilaterally, respirations non-labored.  CARDIAC: RR, normal S1S2, no murmurs.  ABDOMEN: soft, BS present, no masses, no hernias, no peritoneal signs, no KLS, nontender, PEG tube - slight erythema, no fluctuance or induration, no drainage observed  VASCULAR: Equal and normal pulses.  MUSCULOSKELETAL: Full ROM, no deformities.      T(C): 36.9 (06-24-23 @ 07:30), Max: 37.3 (06-23-23 @ 17:40)  HR: 68 (06-24-23 @ 07:30) (65 - 75)  BP: 108/62 (06-24-23 @ 07:30) (108/62 - 115/83)  RR: 18 (06-24-23 @ 07:30) (16 - 19)  SpO2: 97% (06-24-23 @ 07:30) (95% - 98%)                          10.8   2.83  )-----------( 142      ( 24 Jun 2023 07:40 )             32.2       06-24    142  |  107  |  9   ----------------------------<  91  3.8   |  27  |  0.37<L>    Ca    8.3<L>      24 Jun 2023 07:40    TPro  5.7<L>  /  Alb  3.0<L>  /  TBili  0.4  /  DBili  x   /  AST  20  /  ALT  22  /  AlkPhos  68  06-24

## 2023-06-25 NOTE — CONSULT NOTE ADULT - ASSESSMENT
Patient is a 72y female with a history of ALS on experimental treatment, s/p placement of G tube in March of 2023, who has been feeling week x past week and noted some pain and drainage around G tube site and was sent to ER for admission.She was admitted 6/23. No fever. G tube has been functioning. She was placed on aztreonam.metronidazole/vancomycin.  Her CT scan did not show a drainable collection. General surgery thought that perhaps there was some gastric leakage around G tube with inflammatory changes seen.She reportedly takes Riluzulo and Relyvrio for ALS.  I do not see any evidence of a soft tissue infection on exam today.I tend to agree with Dr Coburn that there was transient leakage of gastric contents into abdominal wall.Her PCN allergy limits antibiotic use,"facial swelling" as per son.  Suggest:  1. Today is day 3 of antibiotics, would like to limit to 3-5 days  2. Monitor exam, abdominal exam benign at present  3. Option for stepdown to oral antibiotic such as doxy may exist but perhaps we should just limit IV course and than observe, 
IMPRESSION: ALS                      Probable leakage of gastric contents into abdominal wall with inflammatory response but no drainable abscess    PLAN: Continue JESSICA            Restart tube feedings at low rate            No indication for tube removal or surgical drainage at this time

## 2023-06-25 NOTE — PROGRESS NOTE ADULT - ASSESSMENT
73yo female w. PMH ALS (on riluzole and on drug trial with Relyvrio baseline can walk with cane assisted but needs help with grooming feeding, nonverbal and communicates with device), PEG p/w r/o PEG site infection.    * r/o Peg site infection with possible phlegmnon/abscess- noted on CTAP as above   -Surgery recommendations appreciated- case discussed verbally with Dr. Coburn, imaging review and does not believe collection, therefore no role for surgical consult at this time   -No surgical intervention  -Continue IV abx- Vanco, Aztreo and Flagyl - Consult ID for further recs   -TF resumed. Nutritionist recs appreciated   -Monitor site, cont routine PEG site care     *ALS  Home Riluzole- NF here, resume if pt able to bring in     6.25 Will update Arturo Byrne [phone] regarding pts current status and plan of care.      71yo female w. PMH ALS (on riluzole and on drug trial with Relyvrio baseline can walk with cane assisted but needs help with grooming feeding, nonverbal and communicates with device), PEG p/w r/o PEG site infection.    * r/o Peg site infection with possible phlegmnon/abscess- noted on CTAP as above   -Surgery recommendations appreciated- case discussed verbally with Dr. Coburn, imaging review and does not believe collection, therefore no role for surgical consult at this time   -No surgical intervention  -Continue IV abx- Vanco, Aztreo and Flagyl - Consult ID for further recs   - f/up Vanco trough prior to 4th dose  -TF resumed. Nutritionist recs appreciated   -Monitor site, cont routine PEG site care     *ALS  Home Riluzole- NF here, resume if pt able to bring in     6.25 Will update Arturo Byrne [phone] regarding pts current status and plan of care.      73yo female w. PMH ALS (on riluzole and on drug trial with Relyvrio baseline can walk with cane assisted but needs help with grooming feeding, nonverbal and communicates with device), PEG p/w r/o PEG site infection.    * r/o Peg site infection with possible phlegmnon/abscess- noted on CTAP as above   -Surgery recommendations appreciated- case discussed verbally with Dr. Coburn, imaging review and does not believe collection, therefore no role for surgical consult at this time   -No surgical intervention  -Continue IV abx- Vanco, Aztreo and Flagyl - Consult ID for further recs   - f/up Vanco trough prior to 4th dose  -TF resumed. Nutritionist recs appreciated   -Monitor site, cont routine PEG site care     *ALS  Home Riluzole- NF here, resume if pt able to bring in     6.25 Will update Son Chavez [phone] regarding pts current status and plan of care, no answer, left voicemail.

## 2023-06-26 LAB
ANION GAP SERPL CALC-SCNC: 8 MMOL/L — SIGNIFICANT CHANGE UP (ref 5–17)
BUN SERPL-MCNC: 12 MG/DL — SIGNIFICANT CHANGE UP (ref 7–23)
CALCIUM SERPL-MCNC: 8.9 MG/DL — SIGNIFICANT CHANGE UP (ref 8.4–10.5)
CHLORIDE SERPL-SCNC: 103 MMOL/L — SIGNIFICANT CHANGE UP (ref 96–108)
CO2 SERPL-SCNC: 27 MMOL/L — SIGNIFICANT CHANGE UP (ref 22–31)
CREAT SERPL-MCNC: 0.49 MG/DL — LOW (ref 0.5–1.3)
EGFR: 101 ML/MIN/1.73M2 — SIGNIFICANT CHANGE UP
GLUCOSE SERPL-MCNC: 98 MG/DL — SIGNIFICANT CHANGE UP (ref 70–99)
HCT VFR BLD CALC: 36.4 % — SIGNIFICANT CHANGE UP (ref 34.5–45)
HGB BLD-MCNC: 12.1 G/DL — SIGNIFICANT CHANGE UP (ref 11.5–15.5)
MCHC RBC-ENTMCNC: 31 PG — SIGNIFICANT CHANGE UP (ref 27–34)
MCHC RBC-ENTMCNC: 33.2 GM/DL — SIGNIFICANT CHANGE UP (ref 32–36)
MCV RBC AUTO: 93.3 FL — SIGNIFICANT CHANGE UP (ref 80–100)
NRBC # BLD: 0 /100 WBCS — SIGNIFICANT CHANGE UP (ref 0–0)
PLATELET # BLD AUTO: 135 K/UL — LOW (ref 150–400)
POTASSIUM SERPL-MCNC: 4 MMOL/L — SIGNIFICANT CHANGE UP (ref 3.5–5.3)
POTASSIUM SERPL-SCNC: 4 MMOL/L — SIGNIFICANT CHANGE UP (ref 3.5–5.3)
RBC # BLD: 3.9 M/UL — SIGNIFICANT CHANGE UP (ref 3.8–5.2)
RBC # FLD: 13.7 % — SIGNIFICANT CHANGE UP (ref 10.3–14.5)
SODIUM SERPL-SCNC: 138 MMOL/L — SIGNIFICANT CHANGE UP (ref 135–145)
WBC # BLD: 2.46 K/UL — LOW (ref 3.8–10.5)
WBC # FLD AUTO: 2.46 K/UL — LOW (ref 3.8–10.5)

## 2023-06-26 PROCEDURE — 99233 SBSQ HOSP IP/OBS HIGH 50: CPT

## 2023-06-26 RX ORDER — ACETAMINOPHEN 500 MG
650 TABLET ORAL
Refills: 0 | Status: DISCONTINUED | OUTPATIENT
Start: 2023-06-26 | End: 2023-06-26

## 2023-06-26 RX ORDER — ACETAMINOPHEN 500 MG
675 TABLET ORAL ONCE
Refills: 0 | Status: DISCONTINUED | OUTPATIENT
Start: 2023-06-27 | End: 2023-06-27

## 2023-06-26 RX ORDER — ACETAMINOPHEN 500 MG
675 TABLET ORAL ONCE
Refills: 0 | Status: COMPLETED | OUTPATIENT
Start: 2023-06-26 | End: 2023-06-26

## 2023-06-26 RX ADMIN — Medication 100 MILLIGRAM(S): at 21:58

## 2023-06-26 RX ADMIN — Medication 50 MILLIGRAM(S): at 06:24

## 2023-06-26 RX ADMIN — Medication 100 MILLIGRAM(S): at 06:24

## 2023-06-26 RX ADMIN — Medication 100 MILLIGRAM(S): at 08:30

## 2023-06-26 RX ADMIN — Medication 50 MILLIGRAM(S): at 21:58

## 2023-06-26 RX ADMIN — Medication 0.5 MILLIGRAM(S): at 21:58

## 2023-06-26 RX ADMIN — Medication 270 MILLIGRAM(S): at 19:51

## 2023-06-26 RX ADMIN — Medication 100 MILLIGRAM(S): at 13:03

## 2023-06-26 RX ADMIN — Medication 675 MILLIGRAM(S): at 20:21

## 2023-06-26 RX ADMIN — Medication 50 MILLIGRAM(S): at 13:02

## 2023-06-26 NOTE — SWALLOW BEDSIDE ASSESSMENT ADULT - SWALLOW EVAL: DIAGNOSIS
Patient received in bed, AAO x4, communicating via [employer] AAC board. Patient accepted PO trials of puree solids and moderately thick liquids via teaspoon with reduced oral grading, anterior loss x1, and suspected delayed a/p transit time. Laryngeal rise appreciated via digital palpation with multiple swallows noted across all consistencies administered. Adequate oral clearance and no clinical signs/symtoms of aspiration/penetration. Recommend to continue NPO at this time secondary to weak cough leading to increased risk of aspiration. MBSS to be scheduled for 6/27 in order to objectively assess swallow and determine safest and least restrictive diet. Patient received in bed, AAO x4, communicating via [employer] AAC board. Patient accepted PO trials of puree solids and moderately thick liquids via teaspoon with reduced oral grading, anterior loss x1, and suspected delayed a/p transit time. Laryngeal rise appreciated via digital palpation with multiple swallows noted across all consistencies administered. Adequate oral clearance and no clinical signs/symtoms of aspiration/penetration. Recommend to continue NPO at this time secondary to weak cough leading to reduced airway protection. MBSS to be scheduled for 6/27 in order to objectively assess swallow and determine safest and least restrictive diet.

## 2023-06-26 NOTE — PROGRESS NOTE ADULT - ASSESSMENT
Patient is a 72y female with a history of ALS on experimental treatment, s/p placement of G tube in March of 2023, who has been feeling week x past week and noted some pain and drainage around G tube site and was sent to ER for admission.She was admitted 6/23. No fever. G tube has been functioning. She was placed on aztreonam.metronidazole/vancomycin.  Her CT scan did not show a drainable collection. General surgery thought that perhaps there was some gastric leakage around G tube with inflammatory changes seen.She reportedly takes Riluzulo and Relyvrio for ALS.  I do not see any evidence of a soft tissue infection on exam today.I tend to agree with Dr Coburn that there was transient leakage of gastric contents into abdominal wall.Her PCN allergy limits antibiotic use,"facial swelling" as per son.She most likely has received an adequate course of antibiotics.  Suggest:  1. Today is day 4 of antibiotics, would like to limit to 3-5 days, can stop antibiotics in AM  2. Monitor exam, abdominal exam benign at present  3. Can stop antibiotics in the AM,no need to extend with oral antibiotics,  4.No ID objection to discharge planning for 6/27

## 2023-06-26 NOTE — SWALLOW BEDSIDE ASSESSMENT ADULT - COMMENTS
Son reports patient is consuming puree solids 2x daily and moderately thick liquids at home in addition to PEG feeds prior to hospital admission.    WBC: 2.46     Chest x-ray: Heart size is normal. Lungs show no acute infiltrate. No pleural effusion. No active parenchymal disease in the chest.

## 2023-06-26 NOTE — PROGRESS NOTE ADULT - ASSESSMENT
73yo female w. PMH ALS (on riluzole and on drug trial with Relyvrio baseline can walk with cane assisted but needs help with grooming feeding, nonverbal and communicates with device), PEG p/w r/o PEG site infection.    * r/o Peg site infection with possible phlegmon/abscess- noted on CTAP as above   -Surgery recommendations appreciated- case discussed verbally with Dr. Coburn, imaging review and does not believe collection, therefore no role for surgical consult at this time   -No surgical intervention  -Continue IV abx- Vanco, Aztreo and Flagyl - Consult ID for further recs   - f/up Vanco trough prior to 4th dose  -TF resumed. Nutritionist recs appreciated   -Monitor site, cont routine PEG site care     *ALS  Home Riluzole- NF here, resume if pt able to bring in     Dispo: Plan for d/c home once medically cleared likely 24-72hrs, pending final ID recs.     6.23 Will update Arturo Byrne [phone] regarding pts current status and plan of care.

## 2023-06-26 NOTE — SWALLOW BEDSIDE ASSESSMENT ADULT - SLP PERTINENT HISTORY OF CURRENT PROBLEM
72F hx of ALS (on riluzole and on drug trial with Relyvrio baseline can walk with cane assisted but needs help with grooming feeding, nonverbal and communicates with device), PEG pw PEG site infection. Per son Chavez at bedside, pt has been more tired this week and several days ago noted by aide to have some erythema and drainage at the PEG site. Pt denied any local tenderness, fevers, chills, cough, and pain.

## 2023-06-26 NOTE — SWALLOW BEDSIDE ASSESSMENT ADULT - ORAL PHASE
Delayed oral transit time Decreased anterior-posterior movement of the bolus/Delayed oral transit time Drysol Counseling:  I discussed with the patient the risks of drysol/aluminum chloride including but not limited to skin rash, itching, irritation, burning.

## 2023-06-26 NOTE — CHART NOTE - NSCHARTNOTEFT_GEN_A_CORE
NUTRITION Follow Up Note    SOURCE: Patient [X]  Medical Record [X]  Nursing Staff [X]  Family Member []    DIET:   Diet, NPO:   Tube Feeding Modality: Gastrostomy  Jevity 1.5 Jeff  Total Volume for 24 Hours (mL): 480  Continuous  Starting Tube Feed Rate {mL per Hour}: 20  Until Goal Tube Feed Rate (mL per Hour): 20  Tube Feed Duration (in Hours): 24  Tube Feed Start Time: 15:00 (06-24-23 @ 14:51) [Active]    Patient with PEG prior to admission. Patient is non-verbal but able to communicate using a writing bored. Patient reports receiving Boost nutritional supplement via PEG BID (provides a total of 1120 kcal, & 28g protein) + oral diet (pureed diet with moderately thick liquids). Patient was seen by GI and cleared to resume tube feeding regimen at low rate. Patient currently tolerating Jevity 1.5 @ 20 mL x 24 hrs via PEG at goal rate. Recommend increasing rate when medically feasible as discussed with NP; Jevity 1.5 @ 20 mL via PEG and increase Q4H until goal rate of 40 mL x 24 hrs is reached (goal rate will provide 1440 kcal, 61g protein, & 730 mL H2O). Suggest 175 mL QID to meet hydration needs. SLP eval noted with recommendations to remain NPO at this time and planned for possible MBS to determine appropriate textured/modified oral diet if able.    SKIN: redness blanchable    EDEMA: no edema noted per nursing flow sheets    LAST BM: No BM noted during admission as per nursing flow sheets    WEIGHT TRENDS:  44.5 kG (6/23/23) - recommend obtaining daily weights      PERTINENT MEDICATIONS: MEDICATIONS  (STANDING):  aztreonam  IVPB 1000 milliGRAM(s) IV Intermittent every 8 hours  LORazepam     Tablet 0.5 milliGRAM(s) Oral at bedtime  metroNIDAZOLE  IVPB 500 milliGRAM(s) IV Intermittent every 8 hours  vancomycin  IVPB 500 milliGRAM(s) IV Intermittent <User Schedule>    MEDICATIONS  (PRN):  acetaminophen     Tablet .. 650 milliGRAM(s) Oral every 4 hours PRN Temp greater or equal to 38C (100.4F), Mild Pain (1 - 3)      PERTINENT LABS:  06-26 Na138 mmol/L Glu 98 mg/dL K+ 4.0 mmol/L Cr  0.49 mg/dL<L> BUN 12 mg/dL 06-24 Alb 3.0 g/dL<L>          ESTIMATED NEEDS:   NEW Estimated nutritional needs: based on [X[ IBW of 100 lb/45.45 kG  Calories: 1363 - 1590 kcals based on 30 - 35 kcals/kg [X[ IBW  Protein: 55g - 64g based on 1.2 - 1.4 g/kg IBW [X[ IBW    PREVIOUS NUTRITION DIAGNOSIS:  Severe Protein Calorie Malnutrition    NUTRITION DIAGNOSIS IS [X] Ongoing - Addressed with recommendations to advance tube feeding rate    NEW NUTRITION DIAGNOSIS: [X] Not Applicable      INTERVENTIONS:   1. Recommend Jevity 1.5 @ 20 mL via PEG and increase Q4H until goal rate of 40 mL x 24 hrs is reached (goal rate will provide 1440 kcal, 61g protein, & 730 mL H2O). Suggest 175 mL QID to meet hydration needs.  2. Follow up per SLP recommendations regarding appropriate textured/modified diet and consistency   3. Monitor tolerance to tube feeding regimen, GI tolerance, labs, weights, skin integrity, & BM regularity     MONITORING & EVALUATION:   1. Weights   2. Tube feeding regimen   3. Skin integrity   4. Tolerance to diet prescription   5. Labs & POCT  6. Follow up (per protocol)    Registered Dietitian/Nutritionist Remains Available.  Dmitri Olvera RD, CDN    Phone# [phone]

## 2023-06-27 ENCOUNTER — TRANSCRIPTION ENCOUNTER (OUTPATIENT)
Age: 73
End: 2023-06-27

## 2023-06-27 VITALS
DIASTOLIC BLOOD PRESSURE: 66 MMHG | TEMPERATURE: 97 F | OXYGEN SATURATION: 95 % | RESPIRATION RATE: 16 BRPM | SYSTOLIC BLOOD PRESSURE: 106 MMHG | HEART RATE: 56 BPM

## 2023-06-27 LAB
ANION GAP SERPL CALC-SCNC: 7 MMOL/L — SIGNIFICANT CHANGE UP (ref 5–17)
BUN SERPL-MCNC: 14 MG/DL — SIGNIFICANT CHANGE UP (ref 7–23)
CALCIUM SERPL-MCNC: 8.6 MG/DL — SIGNIFICANT CHANGE UP (ref 8.4–10.5)
CHLORIDE SERPL-SCNC: 105 MMOL/L — SIGNIFICANT CHANGE UP (ref 96–108)
CO2 SERPL-SCNC: 27 MMOL/L — SIGNIFICANT CHANGE UP (ref 22–31)
CREAT SERPL-MCNC: 0.51 MG/DL — SIGNIFICANT CHANGE UP (ref 0.5–1.3)
EGFR: 100 ML/MIN/1.73M2 — SIGNIFICANT CHANGE UP
GLUCOSE SERPL-MCNC: 95 MG/DL — SIGNIFICANT CHANGE UP (ref 70–99)
HCT VFR BLD CALC: 37.6 % — SIGNIFICANT CHANGE UP (ref 34.5–45)
HGB BLD-MCNC: 12.5 G/DL — SIGNIFICANT CHANGE UP (ref 11.5–15.5)
MCHC RBC-ENTMCNC: 31.5 PG — SIGNIFICANT CHANGE UP (ref 27–34)
MCHC RBC-ENTMCNC: 33.2 GM/DL — SIGNIFICANT CHANGE UP (ref 32–36)
MCV RBC AUTO: 94.7 FL — SIGNIFICANT CHANGE UP (ref 80–100)
NRBC # BLD: 0 /100 WBCS — SIGNIFICANT CHANGE UP (ref 0–0)
PLATELET # BLD AUTO: 146 K/UL — LOW (ref 150–400)
POTASSIUM SERPL-MCNC: 4.3 MMOL/L — SIGNIFICANT CHANGE UP (ref 3.5–5.3)
POTASSIUM SERPL-SCNC: 4.3 MMOL/L — SIGNIFICANT CHANGE UP (ref 3.5–5.3)
RBC # BLD: 3.97 M/UL — SIGNIFICANT CHANGE UP (ref 3.8–5.2)
RBC # FLD: 13.9 % — SIGNIFICANT CHANGE UP (ref 10.3–14.5)
SODIUM SERPL-SCNC: 139 MMOL/L — SIGNIFICANT CHANGE UP (ref 135–145)
WBC # BLD: 3.38 K/UL — LOW (ref 3.8–10.5)
WBC # FLD AUTO: 3.38 K/UL — LOW (ref 3.8–10.5)

## 2023-06-27 PROCEDURE — 74177 CT ABD & PELVIS W/CONTRAST: CPT | Mod: MA

## 2023-06-27 PROCEDURE — 85027 COMPLETE CBC AUTOMATED: CPT

## 2023-06-27 PROCEDURE — 96365 THER/PROPH/DIAG IV INF INIT: CPT

## 2023-06-27 PROCEDURE — 80048 BASIC METABOLIC PNL TOTAL CA: CPT

## 2023-06-27 PROCEDURE — 36415 COLL VENOUS BLD VENIPUNCTURE: CPT

## 2023-06-27 PROCEDURE — 92610 EVALUATE SWALLOWING FUNCTION: CPT

## 2023-06-27 PROCEDURE — 97162 PT EVAL MOD COMPLEX 30 MIN: CPT

## 2023-06-27 PROCEDURE — 80202 ASSAY OF VANCOMYCIN: CPT

## 2023-06-27 PROCEDURE — 85610 PROTHROMBIN TIME: CPT

## 2023-06-27 PROCEDURE — 93005 ELECTROCARDIOGRAM TRACING: CPT

## 2023-06-27 PROCEDURE — 71045 X-RAY EXAM CHEST 1 VIEW: CPT

## 2023-06-27 PROCEDURE — 85025 COMPLETE CBC W/AUTO DIFF WBC: CPT

## 2023-06-27 PROCEDURE — 99239 HOSP IP/OBS DSCHRG MGMT >30: CPT

## 2023-06-27 PROCEDURE — 80053 COMPREHEN METABOLIC PANEL: CPT

## 2023-06-27 PROCEDURE — 99285 EMERGENCY DEPT VISIT HI MDM: CPT | Mod: 25

## 2023-06-27 PROCEDURE — 87086 URINE CULTURE/COLONY COUNT: CPT

## 2023-06-27 RX ADMIN — Medication 50 MILLIGRAM(S): at 05:31

## 2023-06-27 RX ADMIN — Medication 100 MILLIGRAM(S): at 05:21

## 2023-06-27 RX ADMIN — Medication 100 MILLIGRAM(S): at 08:39

## 2023-06-27 NOTE — PROGRESS NOTE ADULT - NS ATTEND AMEND GEN_ALL_CORE FT
71 y/o F w PMH ALS (on riluzole and on drug trial with Relyvrio baseline can walk with cane assisted but needs help with grooming feeding, nonverbal and communicates with device), PEG p/w possible phlegmon/abscess on CTAP admitted for r/o PEG site infection. Surgery - Dr. Coburn appreciated - does not believe collection present - no acute surgical intervention recommended at this time. ID appreciated - can dc abx today. Patient and son declined repeat MBS study. medically optimized for discharge planning.
71 y/o F w PMH ALS (on riluzole and on drug trial with Relyvrio baseline can walk with cane assisted but needs help with grooming feeding, nonverbal and communicates with device), PEG p/w possible phlegmon/abscess on CTAP admitted for r/o PEG site infection. Surgery - Dr. Coburn appreciated - does not believe collection present - no acute surgical intervention recommended at this time. ID appreciated - limit abx to 3-5 days, possible transition to PO pending clinical course.
73 y/o F w PMH ALS (on riluzole and on drug trial with Relyvrio baseline can walk with cane assisted but needs help with grooming feeding, nonverbal and communicates with device), PEG p/w possible phlegmon/abscess on CTAP admitted for r/o PEG site infection. Surgery - Dr. Coburn appreciated - does not believe collection present - no acute surgical intervention recommended at this time. cont vanco, follow trough.

## 2023-06-27 NOTE — PROGRESS NOTE ADULT - SUBJECTIVE AND OBJECTIVE BOX
Patient is a 72y old  Female who presents with a chief complaint of PEG site infection (24 Jun 2023 10:08)      SUBJECTIVE / OVERNIGHT EVENTS:  Pt seen and examined at bedside. No acute events overnight.    Allergies    penicillin (Unknown)    Intolerances        MEDICATIONS  (STANDING):  aztreonam  IVPB 1000 milliGRAM(s) IV Intermittent every 8 hours  LORazepam     Tablet 0.5 milliGRAM(s) Oral at bedtime  LORazepam   Injectable 0.5 milliGRAM(s) IV Push once  metroNIDAZOLE  IVPB 500 milliGRAM(s) IV Intermittent every 8 hours  sodium chloride 0.9%. 500 milliLiter(s) (50 mL/Hr) IV Continuous <Continuous>  vancomycin  IVPB 500 milliGRAM(s) IV Intermittent every 12 hours    MEDICATIONS  (PRN):      Vital Signs Last 24 Hrs  T(C): 36.9 (24 Jun 2023 07:30), Max: 37.3 (23 Jun 2023 17:40)  T(F): 98.4 (24 Jun 2023 07:30), Max: 99.2 (23 Jun 2023 17:40)  HR: 68 (24 Jun 2023 07:30) (65 - 75)  BP: 108/62 (24 Jun 2023 07:30) (108/62 - 115/83)  BP(mean): --  RR: 18 (24 Jun 2023 07:30) (16 - 19)  SpO2: 97% (24 Jun 2023 07:30) (95% - 98%)    Parameters below as of 24 Jun 2023 07:30  Patient On (Oxygen Delivery Method): room air      CAPILLARY BLOOD GLUCOSE        I&O's Summary      PHYSICAL EXAM:  GENERAL: NAD, well-developed elderly female   HEAD:  Atraumatic, Normocephalic  NECK: Supple, No JVD  CHEST/LUNG: Clear to auscultation bilaterally; No wheeze, nonlabored breathing  HEART: Regular rate and rhythm; No murmurs, rubs, or gallops  ABDOMEN: Soft, Nontender, Nondistended; Bowel sounds present, + PEG Tube, nontender   PSYCH: calm, appropriate mood    LABS:                        10.8   2.83  )-----------( 142      ( 24 Jun 2023 07:40 )             32.2     06-24    142  |  107  |  9   ----------------------------<  91  3.8   |  27  |  0.37<L>    Ca    8.3<L>      24 Jun 2023 07:40    TPro  5.7<L>  /  Alb  3.0<L>  /  TBili  0.4  /  DBili  x   /  AST  20  /  ALT  22  /  AlkPhos  68  06-24    PT/INR - ( 24 Jun 2023 07:40 )   PT: 13.3 sec;   INR: 1.14 ratio               Urinalysis Basic - ( 24 Jun 2023 07:40 )    Color: x / Appearance: x / SG: x / pH: x  Gluc: 91 mg/dL / Ketone: x  / Bili: x / Urobili: x   Blood: x / Protein: x / Nitrite: x   Leuk Esterase: x / RBC: x / WBC x   Sq Epi: x / Non Sq Epi: x / Bacteria: x        RADIOLOGY & ADDITIONAL TESTS:  Results Reviewed:   Imaging Personally Reviewed:  Electrocardiogram Personally Reviewed:    COORDINATION OF CARE:  Care Discussed with Consultants/Other Providers [Y/N]:  Prior or Outpatient Records Reviewed [Y/N]:
Patient is a 72y old  Female who presents with a chief complaint of PEG site infection (26 Jun 2023 16:39)      Patient seen and examined at bedside. No overnight events reported.     ALLERGIES:  penicillin (Unknown)    MEDICATIONS  (STANDING):  acetaminophen   IVPB .. 675 milliGRAM(s) IV Intermittent once  aztreonam  IVPB 1000 milliGRAM(s) IV Intermittent every 8 hours  LORazepam   Injectable 0.5 milliGRAM(s) IV Push at bedtime  metroNIDAZOLE  IVPB 500 milliGRAM(s) IV Intermittent every 8 hours  vancomycin  IVPB 500 milliGRAM(s) IV Intermittent <User Schedule>    MEDICATIONS  (PRN):    Vital Signs Last 24 Hrs  T(F): 97.2 (27 Jun 2023 05:17), Max: 97.7 (26 Jun 2023 15:26)  HR: 56 (27 Jun 2023 05:17) (56 - 63)  BP: 106/66 (27 Jun 2023 05:17) (101/63 - 106/66)  RR: 16 (27 Jun 2023 05:17) (16 - 16)  SpO2: 95% (27 Jun 2023 05:17) (95% - 95%)  I&O's Summary    26 Jun 2023 07:01  -  27 Jun 2023 07:00  --------------------------------------------------------  IN: 0 mL / OUT: 400 mL / NET: -400 mL      PHYSICAL EXAM:  General: NAD,  Awake, alert. Non-verbal, however nodes to answer and using writing board.  ENT: No gross hearing impairment, Moist mucous membranes, no thrush  Neck: Supple, No JVD  Lungs: Clear to auscultation bilaterally, good air entry, non-labored breathing  Cardio: RRR, S1/S2, No murmur  Abdomen: + PEG, Soft, Nontender, Nondistended; Bowel sounds present  Extremities: No calf tenderness, No cyanosis, No pitting edema  Psych: Appropriate mood and affect    LABS:                        12.5   3.38  )-----------( 146      ( 27 Jun 2023 07:05 )             37.6     06-27    139  |  105  |  14  ----------------------------<  95  4.3   |  27  |  0.51    Ca    8.6      27 Jun 2023 07:05                                        Urinalysis Basic - ( 27 Jun 2023 07:05 )    Color: x / Appearance: x / SG: x / pH: x  Gluc: 95 mg/dL / Ketone: x  / Bili: x / Urobili: x   Blood: x / Protein: x / Nitrite: x   Leuk Esterase: x / RBC: x / WBC x   Sq Epi: x / Non Sq Epi: x / Bacteria: x        Culture - Urine (collected 23 Jun 2023 17:40)  Source: Clean Catch Clean Catch (Midstream)  Final Report (25 Jun 2023 16:43):    <10,000 CFU/mL Normal Urogenital Neeta        RADIOLOGY & ADDITIONAL TESTS:    Care Discussed with Consultants/Other Providers:   
CC: f/u for  peg site infection  Patient reports  she is ok  REVIEW OF SYSTEMS:  All other review of systems negative (Comprehensive ROS)    Antimicrobials Day #  :5  aztreonam  IVPB 1000 milliGRAM(s) IV Intermittent every 8 hours  metroNIDAZOLE  IVPB 500 milliGRAM(s) IV Intermittent every 8 hours  vancomycin  IVPB 500 milliGRAM(s) IV Intermittent <User Schedule>    Other Medications Reviewed    T(F): 97.2 (06-27-23 @ 05:17), Max: 97.7 (06-26-23 @ 15:26)  HR: 56 (06-27-23 @ 05:17)  BP: 106/66 (06-27-23 @ 05:17)  RR: 16 (06-27-23 @ 05:17)  SpO2: 95% (06-27-23 @ 05:17)  Wt(kg): --    PHYSICAL EXAM:  General: alert, no acute distress  Eyes:  anicteric, no conjunctival injection, no discharge  Oropharynx: no lesions or injection 	  Neck: supple, without adenopathy  Lungs: clear to auscultation  Heart: regular rate and rhythm; no murmur, rubs or gallops  Abdomen: soft, nondistended, nontender, without mass or organomegaly, peg site is clean  Skin: no lesions  Extremities: no clubbing, cyanosis, or edema  Neurologic: alert, oriented, moves hands    LAB RESULTS:                        12.5   3.38  )-----------( 146      ( 27 Jun 2023 07:05 )             37.6     06-27    139  |  105  |  14  ----------------------------<  95  4.3   |  27  |  0.51    Ca    8.6      27 Jun 2023 07:05        Urinalysis Basic - ( 27 Jun 2023 07:05 )    Color: x / Appearance: x / SG: x / pH: x  Gluc: 95 mg/dL / Ketone: x  / Bili: x / Urobili: x   Blood: x / Protein: x / Nitrite: x   Leuk Esterase: x / RBC: x / WBC x   Sq Epi: x / Non Sq Epi: x / Bacteria: x      MICROBIOLOGY:  RECENT CULTURES:  06-23 @ 17:40 Clean Catch Clean Catch (Midstream)     <10,000 CFU/mL Normal Urogenital Neeta          RADIOLOGY REVIEWED:      < from: CT Abdomen and Pelvis w/ IV Cont (06.23.23 @ 19:00) >    ACC: [id number] EXAM:  CT ABDOMEN AND PELVIS IC   ORDERED BY: DANIEL TORRES     PROCEDURE DATE:  06/23/2023          INTERPRETATION:  CLINICAL INFORMATION: PEG tube with drainage and redness   at site.    COMPARISON: CT pelvis 12/28/2022    CONTRAST/COMPLICATIONS:  IV Contrast: Omnipaque 350  90 cc administered   10 cc discarded  Oral Contrast: NONE  Complications: None reported at time of study completion    PROCEDURE:  CT of the Abdomen and Pelvis was performed.  Sagittal and coronal reformats were performed.    FINDINGS:    LOWER CHEST: No visualized pleural effusion. Dependent   atelectasis/scarring.    LIVER: Liver size within normal limits. Main portal vein and hepatic   veins are patent.  BILE DUCTS: No distention  GALLBLADDER:Unremarkable CT appearance  SPLEEN: Spleen size within normal limits  PANCREAS: No acute peripancreatic inflammation. Evaluation is limited due   to streak artifact.  ADRENALS: Unremarkable  KIDNEYS/URETERS: No hydronephrosis    BLADDER: Minimally distended.  REPRODUCTIVE ORGANS: Uterus and adnexa are suboptimally characterized on   CT.    BOWEL: Gastrostomy tube bumper localized to the stomach. Prominent   inflammatory changes and edema associated with the gastrostomy tube tract   along the abdominal wall. Focal fluid along the abdominal wall adjacent   to the tube measures 11 mm on image 37 series 2. Phlegmonous change   versus early abscess can be considered given the concern for infection.   Stomach is mildly distended. No small bowel distention. Appendix not   visualized. Mild stool burden of the colon limits evaluation of the   colonic mucosa.  PERITONEUM: No ascites.  VESSELS: No abdominal aortic aneurysm. Atheromatous changes.  RETROPERITONEUM/LYMPH NODES: Small volume nodes.  ABDOMINAL WALL: Prominent inflammatory changes and edema associated with   the gastrostomy tube tract along the abdominal wall. No drainable   collection identified. Correlate for infection.  BONES: Osseous demineralization and degenerative changes of the bones.   Left femoral head pins with surrounding streak artifact.    IMPRESSION:    Gastrostomy tube bumper localized to the stomach. Prominent inflammatory   changes and edema associated with the gastrostomy tube tract along the   abdominal wall. Focal fluid along the abdominal wall adjacent to the tube   measures 11 mm on image 37 series 2. Phlegmonous change versus early   abscess can be considered given the concern for infection. Follow-up to   resolution.    --- End of Report ---      < end of copied text >          Assessment:  Patient with ALS, peg, came in for peg site infection. it looks fine now, no induration or redness or drainage. She has had an adequate course of antibiotics for peg site infection now  Plan:  stop antibiotics and monitor off  no ID objection to d/c  local peg site care per surgery and gi
CC: f/u for G Tube tract infection    Patient reports: she is tolerating G tube feeds    REVIEW OF SYSTEMS:  All other review of systems negative (Comprehensive ROS)    Antimicrobials Day #  :day 4  aztreonam  IVPB 1000 milliGRAM(s) IV Intermittent every 8 hours  metroNIDAZOLE  IVPB 500 milliGRAM(s) IV Intermittent every 8 hours  vancomycin  IVPB 500 milliGRAM(s) IV Intermittent <User Schedule>    Other Medications Reviewed  MEDICATIONS  (STANDING):  aztreonam  IVPB 1000 milliGRAM(s) IV Intermittent every 8 hours  LORazepam     Tablet 0.5 milliGRAM(s) Oral at bedtime  metroNIDAZOLE  IVPB 500 milliGRAM(s) IV Intermittent every 8 hours  vancomycin  IVPB 500 milliGRAM(s) IV Intermittent <User Schedule>    T(F): 97.7 (06-26-23 @ 15:26), Max: 97.8 (06-25-23 @ 20:30)  HR: 63 (06-26-23 @ 15:26)  BP: 101/63 (06-26-23 @ 15:26)  RR: 16 (06-26-23 @ 15:26)  SpO2: 95% (06-26-23 @ 15:26)  Wt(kg): --    PHYSICAL EXAM:  General: alert, no acute distress, frail and thin  Eyes:  anicteric, no conjunctival injection, no discharge  Oropharynx: no lesions or injection 	  Neck: supple, without adenopathy  Lungs: clear to auscultation  Heart: regular rate and rhythm; no murmur, rubs or gallops  Abdomen: soft, nondistended, nontender, G tube exit without drainage , no erythema  Skin: no lesions  Extremities: no clubbing, cyanosis, or edema  Neurologic: alert, oriented, generalized weakness    LAB RESULTS:                        12.1   2.46  )-----------( 135      ( 26 Jun 2023 07:16 )             36.4     06-26    138  |  103  |  12  ----------------------------<  98  4.0   |  27  |  0.49<L>    Ca    8.9      26 Jun 2023 07:16        Urinalysis Basic - ( 26 Jun 2023 07:16 )    Color: x / Appearance: x / SG: x / pH: x  Gluc: 98 mg/dL / Ketone: x  / Bili: x / Urobili: x   Blood: x / Protein: x / Nitrite: x   Leuk Esterase: x / RBC: x / WBC x   Sq Epi: x / Non Sq Epi: x / Bacteria: x      MICROBIOLOGY:  RECENT CULTURES:  06-23 @ 17:40 Clean Catch Clean Catch (Midstream)     <10,000 CFU/mL Normal Urogenital Neeta          RADIOLOGY REVIEWED:    
Patient is a 72y old  Female who presents with a chief complaint of Infection of gastrostomy     (24 Jun 2023 13:56)    Patient seen and examined at bedside.  S: offers no complaints, denies f/c.     ALLERGIES:  penicillin (Unknown)    MEDICATIONS:  acetaminophen     Tablet .. 650 milliGRAM(s) Oral every 4 hours PRN  aztreonam  IVPB 1000 milliGRAM(s) IV Intermittent every 8 hours  LORazepam     Tablet 0.5 milliGRAM(s) Oral at bedtime  metroNIDAZOLE  IVPB 500 milliGRAM(s) IV Intermittent every 8 hours  vancomycin  IVPB 500 milliGRAM(s) IV Intermittent every 12 hours    Vital Signs Last 24 Hrs  T(F): 98 (25 Jun 2023 09:10), Max: 98 (25 Jun 2023 09:10)  HR: 66 (25 Jun 2023 09:10) (66 - 96)  BP: 108/71 (25 Jun 2023 09:10) (107/72 - 177/97)  RR: 16 (25 Jun 2023 09:10) (16 - 18)  SpO2: 95% (25 Jun 2023 09:10) (95% - 99%)  I&O's Summary      PHYSICAL EXAM:  General: NAD, Chronic ill appearing. A/O x 3. Non-verbal-Nodes yes/no to answer, also uses writing board to communicate.  ENT: MMM  Lungs: Clear to auscultation bilaterally (Anteriorly)   Cardio: RR, S1/S2, No murmurs  Abdomen: Soft, NT/ND, Normal active Bowel Sounds. Peg site c/d/i, no drainage noted, no surrounding erythremia   Extremities: No cyanosis, No edema. Some contractions noted.       LABS:                        10.8   2.83  )-----------( 142      ( 24 Jun 2023 07:40 )             32.2     06-24    142  |  107  |  9   ----------------------------<  91  3.8   |  27  |  0.37    Ca    8.3      24 Jun 2023 07:40    TPro  5.7  /  Alb  3.0  /  TBili  0.4  /  DBili  x   /  AST  20  /  ALT  22  /  AlkPhos  68  06-24      PT/INR - ( 24 Jun 2023 07:40 )   PT: 13.3 sec;   INR: 1.14 ratio          Urinalysis Basic - ( 24 Jun 2023 07:40 )  Color: x / Appearance: x / SG: x / pH: x  Gluc: 91 mg/dL / Ketone: x  / Bili: x / Urobili: x   Blood: x / Protein: x / Nitrite: x   Leuk Esterase: x / RBC: x / WBC x   Sq Epi: x / Non Sq Epi: x / Bacteria: x        RADIOLOGY & ADDITIONAL TESTS:    < from: CT Abdomen and Pelvis w/ IV Cont (06.23.23 @ 19:00) >  Gastrostomy tube bumper localized to the stomach. Prominent inflammatory   changes and edema associated with the gastrostomy tube tract along the   abdominal wall. Focal fluid along the abdominal wall adjacent to the tube   measures 11 mm on image 37 series 2. Phlegmonous change versus early   abscess can be considered given the concern for infection. Follow-up to   resolution.      < from: Xray Chest 1 View- PORTABLE-Urgent (06.23.23 @ 17:31) >  No active parenchymal disease in the chest.      Care Discussed with Consultants/Other Providers:   PADMINI Camejo discussed case and plan with Dr. Bender
Patient is a 72y old  Female who presents with a chief complaint of PEG site infection (25 Jun 2023 13:57)    Patient seen and examined at bedside.  S: Denies new complaints. No abd pain, f/c.    ALLERGIES:  penicillin (Unknown)    MEDICATIONS:  acetaminophen     Tablet .. 650 milliGRAM(s) Oral every 4 hours PRN  aztreonam  IVPB 1000 milliGRAM(s) IV Intermittent every 8 hours  LORazepam     Tablet 0.5 milliGRAM(s) Oral at bedtime  metroNIDAZOLE  IVPB 500 milliGRAM(s) IV Intermittent every 8 hours  vancomycin  IVPB 500 milliGRAM(s) IV Intermittent <User Schedule>    Vital Signs Last 24 Hrs  T(F): 97.8 (26 Jun 2023 07:49), Max: 98.8 (25 Jun 2023 15:32)  HR: 62 (26 Jun 2023 07:49) (62 - 68)  BP: 97/65 (26 Jun 2023 07:49) (97/65 - 125/84)  RR: 16 (26 Jun 2023 07:49) (16 - 17)  SpO2: 95% (26 Jun 2023 07:49) (95% - 97%)  I&O's Summary    25 Jun 2023 07:01  -  26 Jun 2023 07:00  --------------------------------------------------------  IN: 514 mL / OUT: 950 mL / NET: -436 mL      PHYSICAL EXAM:  General: NAD, Awake, alert. Non-verbal, however nodes to answer and using writing board.   ENT: MMM  Lungs: Clear to auscultation bilaterally (Anteriorly)   Cardio: RR, S1/S2  Abdomen: Soft, NT/ND, Normal active Bowel Sounds. PEG site c/d/i, no drainage   Extremities: No cyanosis, No edema      LABS:                        12.1   2.46  )-----------( 135      ( 26 Jun 2023 07:16 )             36.4     06-26    138  |  103  |  12  ----------------------------<  98  4.0   |  27  |  0.49    Ca    8.9      26 Jun 2023 07:16    TPro  5.7  /  Alb  3.0  /  TBili  0.4  /  DBili  x   /  AST  20  /  ALT  22  /  AlkPhos  68  06-24      PT/INR - ( 24 Jun 2023 07:40 )   PT: 13.3 sec;   INR: 1.14 ratio           Urinalysis Basic - ( 26 Jun 2023 07:16 )  Color: x / Appearance: x / SG: x / pH: x  Gluc: 98 mg/dL / Ketone: x  / Bili: x / Urobili: x   Blood: x / Protein: x / Nitrite: x   Leuk Esterase: x / RBC: x / WBC x   Sq Epi: x / Non Sq Epi: x / Bacteria: x        Culture - Urine (collected 23 Jun 2023 17:40)  Source: Clean Catch Clean Catch (Midstream)  Final Report (25 Jun 2023 16:43):    <10,000 CFU/mL Normal Urogenital Neeta          RADIOLOGY & ADDITIONAL TESTS:    < from: CT Abdomen and Pelvis w/ IV Cont (06.23.23 @ 19:00) >  Gastrostomy tube bumper localized to the stomach. Prominent inflammatory   changes and edema associated with the gastrostomy tube tract along the   abdominal wall. Focal fluid along the abdominal wall adjacent to the tube   measures 11 mm on image 37 series 2. Phlegmonous change versus early   abscess can be considered given the concern for infection. Follow-up to   resolution.    Care Discussed with Consultants/Other Providers:   PADMINI Camejo discussed case and plan with Dr. Bender

## 2023-06-27 NOTE — DISCHARGE NOTE PROVIDER - INSTRUCTIONS
Cardiovascular Disease
Jevity 1.5 linwood  Total volume for 24hrs (mL): 960  Total volume of bolus (mL): 240, Bolus feed duration : 1 hour  Total # of feeds: 4  Tube feed frequency: Every 6 hours, Tube feed start time 08:00  Tube feed at 8am, 12pm, 4pm, 8pm  Free water flush  Total volume 175mL  Frequency: Every 6 hours, provide 175mL water flushes at least 1 hour apart from bolus feedings

## 2023-06-27 NOTE — DISCHARGE NOTE NURSING/CASE MANAGEMENT/SOCIAL WORK - PATIENT PORTAL LINK FT
You can access the FollowMyHealth Patient Portal offered by [facility] by registering at the following website: [email or web address]. By joining [facility]’s FollowMyHealth portal, you will also be able to view your health information using other applications (apps) compatible with our system.

## 2023-06-27 NOTE — PROGRESS NOTE ADULT - ASSESSMENT
73yo female w. PMH ALS (on riluzole and on drug trial with Relyvrio baseline can walk with cane assisted but needs help with grooming feeding, nonverbal and communicates with device), PEG p/w r/o PEG site infection.    * r/o Peg site infection with possible phlegmon/abscess- noted on CTAP as above   -Surgery recommendations appreciated- case discussed verbally with Dr. Coburn, imaging review and does not believe collection, therefore no role for surgical consult at this time   -No surgical intervention  -Continue IV abx- Vanco, Aztreo and Flagyl for now pending ID final recs  - ID recs appreciated, f/u final recs   - f/up Vanco trough  -TF resumed. Nutritionist recs appreciated   -Monitor site, cont routine PEG site care     *ALS  Home Riluzole- NF here, resume if pt able to bring in     Dispo: Plan for d/c home once medically cleared, pending final ID recs.     6.23 Will update Arturo Byrne [phone] regarding pts current status and plan of care.     71yo female w. PMH ALS (on riluzole and on drug trial with Relyvrio baseline can walk with cane assisted but needs help with grooming feeding, nonverbal and communicates with device), PEG p/w r/o PEG site infection.    * r/o Peg site infection with possible phlegmon/abscess- noted on CTAP as above   -Surgery recommendations appreciated- case discussed verbally with Dr. Coburn, imaging review and does not believe collection, therefore no role for surgical consult at this time   -No surgical intervention  -Discontinue IV abx- Vanco, Aztreo and Flagyl per ID final recs  - ID recs appreciated  - f/up Vanco trough  -TF resumed. Nutritionist recs appreciated   -Monitor site, cont routine PEG site care     *ALS  Home Riluzole- NF here, resume if pt able to bring in     Dispo: Plan for d/c home once medically cleared, pending final ID recs.     6.27 updated Son Chavez [phone] regarding pts current status and plan of care.

## 2023-06-27 NOTE — DISCHARGE NOTE PROVIDER - NSDCMRMEDTOKEN_GEN_ALL_CORE_FT
Ativan 0.5 mg oral tablet: 1 tablet orally once a day (at bedtime) as needed for  agitation  Relyvrio 3 g-1 g oral powder for reconstitution: 1 orally  riluzole 50 mg oral tablet: 1 tab(s) orally 2 times a day

## 2023-06-27 NOTE — DISCHARGE NOTE PROVIDER - HOSPITAL COURSE
Hospital Course  HPI:  72F hx of ALS (on riluzole and on drug trial with Relyvrio baseline can walk with cane assisted but needs help with grooming feeding, nonverbal and communicates with device), PEG pw PEG site infection. Per son Chavez at bedside, pt has been more tired this week and several days ago noted by aide to have some erythema and drainage at the PEG site. Pt denied any local tenderness, fevers, chills, cough, abd pain or dysuria. In ED, Tmax 99.2 P: 67 BP: 110/73 sat 96% on RA. WBC: 2.72 51%N cr: 0.49 UA neg.   CTAP:  rad< from: CT Abdomen and Pelvis w/ IV Cont (06.23.23 @ 19:00) >  IMPRESSION:    Gastrostomy tube bumper localized to the stomach. Prominent inflammatory   changes and edema associated with the gastrostomy tube tract along the   abdominal wall. Focal fluid along the abdominal wall adjacent to the tube   measures 11 mm on image 37 series 2. Phlegmonous change versus early   abscess can be considered given the concern for infection. Follow-up to   resolution.    < end of copied text >   (23 Jun 2023 20:55)    Upon admission, patient was started on IV abx. Surgery evaluated the patient, no surgical intervention at this time. Tube feedings were restarted slowly and advanced as tolerated. ID was consulted, completed 5 days of abx, no further antibiotics recommended. Physical therapy evaluated the patient and recommended home with PT. Per patient's son, patient has home health aides in place. Patient should follow up with PCP within 1 week.       You were admitted for lethargy   You were treated with IV antibiotics  Follow up with primary care provider within 1 week  Physical therapy recommends home PT and continuation of home health aides     You will need to follow up with your primary care physician.    Discharging Provider:  DIEGO Girard  Contact Info: [phone] - Please call with any questions or concerns.    Outpatient Provider: Dr. Ellis      Hospital Course  HPI:  72F hx of ALS (on riluzole and on drug trial with Relyvrio baseline can walk with cane assisted but needs help with grooming feeding, nonverbal and communicates with device), PEG pw PEG site infection. Per son Chavez at bedside, pt has been more tired this week and several days ago noted by aide to have some erythema and drainage at the PEG site. Pt denied any local tenderness, fevers, chills, cough, abd pain or dysuria. In ED, Tmax 99.2 P: 67 BP: 110/73 sat 96% on RA. WBC: 2.72 51%N cr: 0.49 UA neg.   CTAP:  rad< from: CT Abdomen and Pelvis w/ IV Cont (06.23.23 @ 19:00) >  IMPRESSION:    Gastrostomy tube bumper localized to the stomach. Prominent inflammatory   changes and edema associated with the gastrostomy tube tract along the   abdominal wall. Focal fluid along the abdominal wall adjacent to the tube   measures 11 mm on image 37 series 2. Phlegmonous change versus early   abscess can be considered given the concern for infection. Follow-up to   resolution.    < end of copied text >   (23 Jun 2023 20:55)    Upon admission, patient was started on IV abx. Surgery evaluated the patient, no surgical intervention at this time. Tube feedings were restarted slowly and advanced as tolerated. ID was consulted, completed 5 days of abx, no further antibiotics recommended. Physical therapy evaluated the patient and recommended home with PT. Per patient's son, patient has home health aides in place. Patient should follow up with PCP within 1 week.       You were admitted for lethargy   You were treated with IV antibiotics  Follow up with primary care provider within 1 week  Physical therapy recommends home PT and continuation of home health aides     You will need to follow up with your primary care physician.    Discharging Provider:  DIEGO Girard  Contact Info: [phone] - Please call with any questions or concerns.    Outpatient Provider: Dr. Ellis     Time Spent: 45 minutes

## 2023-06-27 NOTE — DISCHARGE NOTE PROVIDER - NSDCCPCAREPLAN_GEN_ALL_CORE_FT
PRINCIPAL DISCHARGE DIAGNOSIS  Diagnosis: Infection of PEG site  Assessment and Plan of Treatment: You were admitted for lethargy   You were treated with IV antibiotics  Follow up with primary care provider within 1 week  Physical therapy recommends home PT and continuation of home health aides

## 2023-06-27 NOTE — PHYSICAL THERAPY INITIAL EVALUATION ADULT - RANGE OF MOTION EXAMINATION, REHAB EVAL
bilateral UE shoulders <90 active, WFL PROM/bilateral lower extremity ROM was WFL (within functional limits)

## 2023-06-27 NOTE — DISCHARGE NOTE PROVIDER - DETAILS OF MALNUTRITION DIAGNOSIS/DIAGNOSES
This patient has been assessed with a concern for Malnutrition and was treated during this hospitalization for the following Nutrition diagnosis/diagnoses:     -  06/24/2023: Severe protein-calorie malnutrition

## 2023-06-27 NOTE — PROGRESS NOTE ADULT - NUTRITIONAL ASSESSMENT
This patient has been assessed with a concern for Malnutrition and has been determined to have a diagnosis/diagnoses of Severe protein-calorie malnutrition.    This patient is being managed with:   Diet NPO-  Tube Feeding Modality: Gastrostomy  Jevity 1.5 Jeff  Total Volume for 24 Hours (mL): 480  Continuous  Starting Tube Feed Rate {mL per Hour}: 20  Until Goal Tube Feed Rate (mL per Hour): 20  Tube Feed Duration (in Hours): 24  Tube Feed Start Time: 15:00  Entered: Jun 24 2023  2:51PM  
This patient has been assessed with a concern for Malnutrition and has been determined to have a diagnosis/diagnoses of Severe protein-calorie malnutrition.    This patient is being managed with:   Diet NPO with Tube Feed-  Tube Feeding Modality: Gastrostomy  Jevity 1.5 Jeff  Total Volume for 24 Hours (mL): 960  Bolus  Total Volume of Bolus (mL):  240  Total # of Feeds: 4  Tube Feed Frequency: Every 6 hours   Tube Feed Start Time: 08:00  Bolus Feed Rate (mL per Hour): 240   Bolus Feed Duration (in Hours): 1  Bolus Feed Instructions:  Please provide 240 mL bolus feedings at 8am 12pm 4pm & 8pm  Free Water Flush  Bolus   Total Volume per Flush (mL): 175   Frequency: Every 6 Hours  Free Water Flush Instructions:  Please provide 175 mL water flushes QID at least 1 hour apart from bolus feedings  Entered: Jun 27 2023 10:34AM  
This patient has been assessed with a concern for Malnutrition and has been determined to have a diagnosis/diagnoses of Severe protein-calorie malnutrition.    This patient is being managed with:   Diet NPO-  Tube Feeding Modality: Gastrostomy  Jevity 1.5 Jeff  Total Volume for 24 Hours (mL): 480  Continuous  Starting Tube Feed Rate {mL per Hour}: 20  Until Goal Tube Feed Rate (mL per Hour): 20  Tube Feed Duration (in Hours): 24  Tube Feed Start Time: 15:00  Entered: Jun 24 2023  2:51PM

## 2023-06-27 NOTE — PHYSICAL THERAPY INITIAL EVALUATION ADULT - ADDITIONAL COMMENTS
pt lives alone has 24/7 A 3 steps to enter then stays on main level pt reports that at her baseline she can walk with cane mainly on her own but needs assist with ADLs, pt is nonverbal and communicates with writing board device, has RW, WC, cane, elevated toilet seat, shower chair

## 2023-06-27 NOTE — PHYSICAL THERAPY INITIAL EVALUATION ADULT - LEVEL OF INDEPENDENCE: BED TO CHAIR, REHAB EVAL
Vitals obtained, allergies and medications reviewed verbally with patient via EPIC   pt did not want to sit in a chair/unable to perform

## 2023-06-27 NOTE — DISCHARGE NOTE PROVIDER - CARE PROVIDER_API CALL
Dmitri Huizar.  Surgery  [address], [address]  [place], [place] [id number]  Phone: [phone]  Fax: [phone]  Follow Up Time:

## 2023-06-27 NOTE — PHYSICAL THERAPY INITIAL EVALUATION ADULT - PERTINENT HX OF CURRENT PROBLEM, REHAB EVAL
pt is a 71 yo female w. [facility] ALS (on riluzole and on drug trial with Relyvrio, p/w r/o PEG site infection. pt patient has been more tired this week and several days ago noted by aide to have some erythema and drainage at the PEG site

## 2023-06-27 NOTE — PROGRESS NOTE ADULT - REASON FOR ADMISSION
PEG site infection

## 2023-06-28 ENCOUNTER — NON-APPOINTMENT (OUTPATIENT)
Age: 73
End: 2023-06-28

## 2023-08-21 ENCOUNTER — NON-APPOINTMENT (OUTPATIENT)
Age: 73
End: 2023-08-21

## 2023-10-08 ENCOUNTER — EMERGENCY (EMERGENCY)
Facility: HOSPITAL | Age: 73
LOS: 1 days | End: 2023-10-08
Attending: STUDENT IN AN ORGANIZED HEALTH CARE EDUCATION/TRAINING PROGRAM | Admitting: STUDENT IN AN ORGANIZED HEALTH CARE EDUCATION/TRAINING PROGRAM
Payer: MEDICARE

## 2023-10-08 VITALS
OXYGEN SATURATION: 86 % | SYSTOLIC BLOOD PRESSURE: 57 MMHG | HEART RATE: 51 BPM | DIASTOLIC BLOOD PRESSURE: 43 MMHG | RESPIRATION RATE: 10 BRPM

## 2023-10-08 VITALS
HEART RATE: 85 BPM | DIASTOLIC BLOOD PRESSURE: 31 MMHG | OXYGEN SATURATION: 98 % | SYSTOLIC BLOOD PRESSURE: 40 MMHG | TEMPERATURE: 97 F | WEIGHT: 132.28 LBS

## 2023-10-08 DIAGNOSIS — Z93.1 GASTROSTOMY STATUS: Chronic | ICD-10-CM

## 2023-10-08 DIAGNOSIS — Z98.890 OTHER SPECIFIED POSTPROCEDURAL STATES: Chronic | ICD-10-CM

## 2023-10-08 LAB
ALBUMIN SERPL ELPH-MCNC: 3 G/DL — LOW (ref 3.3–5)
ALP SERPL-CCNC: 84 U/L — SIGNIFICANT CHANGE UP (ref 40–120)
ALT FLD-CCNC: 368 U/L — HIGH (ref 10–45)
ANION GAP SERPL CALC-SCNC: 18 MMOL/L — HIGH (ref 5–17)
APPEARANCE UR: CLEAR — SIGNIFICANT CHANGE UP
APTT BLD: 23.7 SEC — LOW (ref 24.5–35.6)
AST SERPL-CCNC: 318 U/L — HIGH (ref 10–40)
BASE EXCESS BLDA CALC-SCNC: -12.9 MMOL/L — LOW (ref -2–3)
BASOPHILS # BLD AUTO: 0.02 K/UL — SIGNIFICANT CHANGE UP (ref 0–0.2)
BASOPHILS NFR BLD AUTO: 0.5 % — SIGNIFICANT CHANGE UP (ref 0–2)
BILIRUB SERPL-MCNC: 0.3 MG/DL — SIGNIFICANT CHANGE UP (ref 0.2–1.2)
BILIRUB UR-MCNC: NEGATIVE — SIGNIFICANT CHANGE UP
BUN SERPL-MCNC: 15 MG/DL — SIGNIFICANT CHANGE UP (ref 7–23)
CALCIUM SERPL-MCNC: 8.8 MG/DL — SIGNIFICANT CHANGE UP (ref 8.4–10.5)
CHLORIDE SERPL-SCNC: 104 MMOL/L — SIGNIFICANT CHANGE UP (ref 96–108)
CO2 BLDA-SCNC: 18 MMOL/L — LOW (ref 19–24)
CO2 SERPL-SCNC: 21 MMOL/L — LOW (ref 22–31)
COLOR SPEC: YELLOW — SIGNIFICANT CHANGE UP
CREAT SERPL-MCNC: 1.08 MG/DL — SIGNIFICANT CHANGE UP (ref 0.5–1.3)
DACRYOCYTES BLD QL SMEAR: SLIGHT — SIGNIFICANT CHANGE UP
DIFF PNL FLD: NEGATIVE — SIGNIFICANT CHANGE UP
EGFR: 55 ML/MIN/1.73M2 — LOW
EOSINOPHIL # BLD AUTO: 0.07 K/UL — SIGNIFICANT CHANGE UP (ref 0–0.5)
EOSINOPHIL NFR BLD AUTO: 1.7 % — SIGNIFICANT CHANGE UP (ref 0–6)
GLUCOSE SERPL-MCNC: 262 MG/DL — HIGH (ref 70–99)
GLUCOSE UR QL: NEGATIVE MG/DL — SIGNIFICANT CHANGE UP
HCO3 BLDA-SCNC: 17 MMOL/L — LOW (ref 21–28)
HCT VFR BLD CALC: 34.7 % — SIGNIFICANT CHANGE UP (ref 34.5–45)
HGB BLD-MCNC: 10.4 G/DL — LOW (ref 11.5–15.5)
HOROWITZ INDEX BLDA+IHG-RTO: 100 — SIGNIFICANT CHANGE UP
IMM GRANULOCYTES NFR BLD AUTO: 1.9 % — HIGH (ref 0–0.9)
INR BLD: 1.02 RATIO — SIGNIFICANT CHANGE UP (ref 0.85–1.18)
KETONES UR-MCNC: NEGATIVE MG/DL — SIGNIFICANT CHANGE UP
LEUKOCYTE ESTERASE UR-ACNC: NEGATIVE — SIGNIFICANT CHANGE UP
LYMPHOCYTES # BLD AUTO: 2.3 K/UL — SIGNIFICANT CHANGE UP (ref 1–3.3)
LYMPHOCYTES # BLD AUTO: 56 % — HIGH (ref 13–44)
MACROCYTES BLD QL: SLIGHT — SIGNIFICANT CHANGE UP
MAGNESIUM SERPL-MCNC: 2.4 MG/DL — SIGNIFICANT CHANGE UP (ref 1.6–2.6)
MANUAL SMEAR VERIFICATION: SIGNIFICANT CHANGE UP
MCHC RBC-ENTMCNC: 30 GM/DL — LOW (ref 32–36)
MCHC RBC-ENTMCNC: 31.9 PG — SIGNIFICANT CHANGE UP (ref 27–34)
MCV RBC AUTO: 106.4 FL — HIGH (ref 80–100)
MONOCYTES # BLD AUTO: 0.15 K/UL — SIGNIFICANT CHANGE UP (ref 0–0.9)
MONOCYTES NFR BLD AUTO: 3.6 % — SIGNIFICANT CHANGE UP (ref 2–14)
NEUTROPHILS # BLD AUTO: 1.49 K/UL — LOW (ref 1.8–7.4)
NEUTROPHILS NFR BLD AUTO: 36.3 % — LOW (ref 43–77)
NITRITE UR-MCNC: NEGATIVE — SIGNIFICANT CHANGE UP
NRBC # BLD: 0 /100 WBCS — SIGNIFICANT CHANGE UP (ref 0–0)
PCO2 BLDA: 54 MMHG — HIGH (ref 32–35)
PH BLDA: 7.1 — CRITICAL LOW (ref 7.35–7.45)
PH UR: 5 — SIGNIFICANT CHANGE UP (ref 5–8)
PLAT MORPH BLD: NORMAL — SIGNIFICANT CHANGE UP
PLATELET # BLD AUTO: 144 K/UL — LOW (ref 150–400)
PO2 BLDA: 425 MMHG — HIGH (ref 83–108)
POLYCHROMASIA BLD QL SMEAR: SLIGHT — SIGNIFICANT CHANGE UP
POTASSIUM SERPL-MCNC: 4.3 MMOL/L — SIGNIFICANT CHANGE UP (ref 3.5–5.3)
POTASSIUM SERPL-SCNC: 4.3 MMOL/L — SIGNIFICANT CHANGE UP (ref 3.5–5.3)
PROT SERPL-MCNC: 5.6 G/DL — LOW (ref 6–8.3)
PROT UR-MCNC: NEGATIVE MG/DL — SIGNIFICANT CHANGE UP
PROTHROM AB SERPL-ACNC: 11.6 SEC — SIGNIFICANT CHANGE UP (ref 9.5–13)
RAPID RVP RESULT: SIGNIFICANT CHANGE UP
RBC # BLD: 3.26 M/UL — LOW (ref 3.8–5.2)
RBC # FLD: 13.3 % — SIGNIFICANT CHANGE UP (ref 10.3–14.5)
RBC BLD AUTO: ABNORMAL
SAO2 % BLDA: 100 % — HIGH (ref 94–98)
SARS-COV-2 RNA SPEC QL NAA+PROBE: SIGNIFICANT CHANGE UP
SODIUM SERPL-SCNC: 143 MMOL/L — SIGNIFICANT CHANGE UP (ref 135–145)
SP GR SPEC: 1.02 — SIGNIFICANT CHANGE UP (ref 1–1.03)
TROPONIN I, HIGH SENSITIVITY RESULT: 185.8 NG/L — HIGH
UROBILINOGEN FLD QL: 0.2 MG/DL — SIGNIFICANT CHANGE UP (ref 0.2–1)
WBC # BLD: 4.11 K/UL — SIGNIFICANT CHANGE UP (ref 3.8–10.5)
WBC # FLD AUTO: 4.11 K/UL — SIGNIFICANT CHANGE UP (ref 3.8–10.5)

## 2023-10-08 PROCEDURE — 87086 URINE CULTURE/COLONY COUNT: CPT

## 2023-10-08 PROCEDURE — 82803 BLOOD GASES ANY COMBINATION: CPT

## 2023-10-08 PROCEDURE — 85730 THROMBOPLASTIN TIME PARTIAL: CPT

## 2023-10-08 PROCEDURE — 85025 COMPLETE CBC W/AUTO DIFF WBC: CPT

## 2023-10-08 PROCEDURE — 71045 X-RAY EXAM CHEST 1 VIEW: CPT | Mod: 26

## 2023-10-08 PROCEDURE — 0225U NFCT DS DNA&RNA 21 SARSCOV2: CPT

## 2023-10-08 PROCEDURE — 84484 ASSAY OF TROPONIN QUANT: CPT

## 2023-10-08 PROCEDURE — 93005 ELECTROCARDIOGRAM TRACING: CPT

## 2023-10-08 PROCEDURE — 96374 THER/PROPH/DIAG INJ IV PUSH: CPT

## 2023-10-08 PROCEDURE — 99291 CRITICAL CARE FIRST HOUR: CPT | Mod: 25

## 2023-10-08 PROCEDURE — 80053 COMPREHEN METABOLIC PANEL: CPT

## 2023-10-08 PROCEDURE — 71045 X-RAY EXAM CHEST 1 VIEW: CPT

## 2023-10-08 PROCEDURE — 99291 CRITICAL CARE FIRST HOUR: CPT

## 2023-10-08 PROCEDURE — 36415 COLL VENOUS BLD VENIPUNCTURE: CPT

## 2023-10-08 PROCEDURE — 93010 ELECTROCARDIOGRAM REPORT: CPT

## 2023-10-08 PROCEDURE — 83735 ASSAY OF MAGNESIUM: CPT

## 2023-10-08 PROCEDURE — 82962 GLUCOSE BLOOD TEST: CPT

## 2023-10-08 PROCEDURE — 85610 PROTHROMBIN TIME: CPT

## 2023-10-08 PROCEDURE — 96375 TX/PRO/DX INJ NEW DRUG ADDON: CPT

## 2023-10-08 RX ORDER — CHLORHEXIDINE GLUCONATE 213 G/1000ML
15 SOLUTION TOPICAL EVERY 12 HOURS
Refills: 0 | Status: DISCONTINUED | OUTPATIENT
Start: 2023-10-08 | End: 2023-10-08

## 2023-10-08 RX ORDER — MIDAZOLAM HYDROCHLORIDE 1 MG/ML
4 INJECTION, SOLUTION INTRAMUSCULAR; INTRAVENOUS ONCE
Refills: 0 | Status: DISCONTINUED | OUTPATIENT
Start: 2023-10-08 | End: 2023-10-08

## 2023-10-08 RX ORDER — MORPHINE SULFATE 50 MG/1
4 CAPSULE, EXTENDED RELEASE ORAL ONCE
Refills: 0 | Status: DISCONTINUED | OUTPATIENT
Start: 2023-10-08 | End: 2023-10-08

## 2023-10-08 RX ORDER — SODIUM CHLORIDE 9 MG/ML
1000 INJECTION INTRAMUSCULAR; INTRAVENOUS; SUBCUTANEOUS ONCE
Refills: 0 | Status: COMPLETED | OUTPATIENT
Start: 2023-10-08 | End: 2023-10-08

## 2023-10-08 RX ORDER — NOREPINEPHRINE BITARTRATE/D5W 8 MG/250ML
0.05 PLASTIC BAG, INJECTION (ML) INTRAVENOUS
Qty: 8 | Refills: 0 | Status: DISCONTINUED | OUTPATIENT
Start: 2023-10-08 | End: 2023-10-11

## 2023-10-08 RX ADMIN — MORPHINE SULFATE 4 MILLIGRAM(S): 50 CAPSULE, EXTENDED RELEASE ORAL at 11:32

## 2023-10-08 RX ADMIN — SODIUM CHLORIDE 1000 MILLILITER(S): 9 INJECTION INTRAMUSCULAR; INTRAVENOUS; SUBCUTANEOUS at 09:51

## 2023-10-08 RX ADMIN — MIDAZOLAM HYDROCHLORIDE 4 MILLIGRAM(S): 1 INJECTION, SOLUTION INTRAMUSCULAR; INTRAVENOUS at 11:50

## 2023-10-08 RX ADMIN — Medication 1 MILLIGRAM(S): at 10:45

## 2023-10-08 RX ADMIN — Medication 5.63 MICROGRAM(S)/KG/MIN: at 09:46

## 2023-10-08 NOTE — ED PROVIDER NOTE - PROGRESS NOTE DETAILS
The patient had no spontaneous respirations, heart sounds, or response to any stimulus including noxious stimuli. The pupils were fixed and dilated at 8mm and with no response to light, no corneal reflexes, no gag reflex, no oculocephalic reflex. Patient was pronounced at 1314. The patients family was contacted and did not desire an autopsy,  services were offered, and  arrangements were discussed.

## 2023-10-08 NOTE — ED PROVIDER NOTE - CRITICAL CARE ATTENDING CONTRIBUTION TO CARE
Upon my evaluation, this patient had a high probability of imminent or lifethreatening deterioration due to cardiac arrest s/p ROSC, comfort care, which required my direct attention, intervention, and personal management.     I have personally provided 31 minutes of critical care time exclusive of time spent on separately billable procedures. Time includes review of laboratory data, radiology results, discussion with consultants, and monitoring for potential decompensation. Interventions were performed as documented above.    - Portillo Chappell MD, Emergency Medicine and Medical Toxicology Attending.

## 2023-10-08 NOTE — ED PROVIDER NOTE - CONVERSATION DETAILS
D/w with the patient about the diagnosis and prognosis of the patient. EMS on arrival found the patient with full code MOLST and began ACLS. On arrival to the ED, gave options about comfort care and to withdrawal all care vs full code/medical tx. Son expressed that he wanted to pursue comfort care and to withdrawal all care and mechanical ventilation.

## 2023-10-08 NOTE — ED PROVIDER NOTE - OBJECTIVE STATEMENT
72F PMHx of ALS presenting s/p cardiac arrest w/ ROSC today. Per son, patient was not feeling well over the past few days. Found the patient unresponsive this morning. EMS was called and found the patient asystole, intubated and performed ACLS, 3 epi and 2 bicarb given. On arrival to the ED, patient s/p ROSC and hypotensive with brown gastric contents. Placed on levophed for BP support.

## 2023-10-08 NOTE — ED ADULT TRIAGE NOTE - CHIEF COMPLAINT QUOTE
BIBA from Home, s/p CA, now Rosc, Ambu via ETT in progress. As per EMS found unresponsive By son at 830 am, Ems arrival 842, rosc achieved by 858, 3 Epi and 1 bicarb.

## 2023-10-08 NOTE — ED ADULT NURSE NOTE - NSFALLRISKINTERV_ED_ALL_ED
Assistance OOB with selected safe patient handling equipment if applicable/Communicate fall risk and risk factors to all staff, patient, and family/Encourage patient to sit up slowly, dangle for a short time, stand at bedside before walking/Provide patient with walking aids/Provide visual cue: yellow wristband, yellow gown, etc/Reinforce activity limits and safety measures with patient and family/Review medications for side effects contributing to fall risk/Toileting schedule using arm’s reach rule for commode and bathroom/Call bell, personal items and telephone in reach/Instruct patient to call for assistance before getting out of bed/chair/stretcher/Non-slip footwear applied when patient is off stretcher/[place] to call system/Physically safe environment - no spills, clutter or unnecessary equipment/Purposeful Proactive Rounding/Room/bathroom lighting operational, light cord in reach

## 2023-10-08 NOTE — ED ADULT NURSE NOTE - HIV OFFER
Regular rate and rhythm, Heart sounds S1 S2 present, no murmurs, rubs or gallops Previously Declined (within the last year)

## 2023-10-08 NOTE — ED PROVIDER NOTE - CLINICAL SUMMARY MEDICAL DECISION MAKING FREE TEXT BOX
Initial Rhythm: Asystole      Cardiac compressions was performed by JANE device  by ems in order to sustain blood flow. The patient was  intubated by ems ventilated, and oxygenated. The patient received appropriate ACLS measures and these were repeated as necessary throughout the resuscitation. CPR was performed under my direct supervision and guidance. See nursing note for medications and times given.     GoC discussed w/ son at bedside. Decision on comfort care and to withdrawal medications/IVF. MOLST filled out. Son wants to have more family at bedside and to withdrawal care then.

## 2023-10-08 NOTE — ED PROVIDER NOTE - PHYSICAL EXAMINATION
VITAL SIGNS: I have reviewed nursing notes and confirm.   GEN: (+) well developed, intubated.  SKIN: Warm, pink, no rash, no diaphoresis, no cyanosis, well perfused.   HEAD: Normocephalic; atraumatic. No scalp lacerations, no abrasions.  NECK: Supple; non tender.   EYES: Pupils 4mm equal, round, nonreactive, conjunctiva and sclera clear.   ENT: No nasal discharge; airway clear. Trachea is midline. Normal dentition.  CV: RRR. S1, S2 normal; no murmurs, gallops, or rubs. Capillary refill < 2 seconds throughout. Distal pulses intact 2+ throughout.  RESP: CTA bilaterally. No wheezes, rales, or rhonchi.   ABD: Normal bowel sounds, soft, non-distended, non-tender, no rebound, no guarding, no rigidity, no hepatosplenomegaly.  MSK and BACK:  No obvious deformities.  NEURO: Intubated on mechanical ventilation.

## 2023-10-09 LAB
CULTURE RESULTS: NO GROWTH — SIGNIFICANT CHANGE UP
SPECIMEN SOURCE: SIGNIFICANT CHANGE UP

## 2024-09-12 NOTE — DISCHARGE NOTE PROVIDER - NSCORESITESY/N_GEN_A_CORE_RD
Thank you for allowing us to care for you at [facility] today. Thank you for your patience.     You have been seen and evaluated for headache and neck discomfort. You had blood work, a ct scan and an ultrasound that did not show severe findings. You were given pain medications and were improved. We discussed your case with your oncologist who agreed with the studies obtained and would like you to follow-up next week as planned.    We reviewed the results from your visit in the emergency department.   Please read the instructions provided  If provided, take prescriptions as instructed.     Remember, you care process does not end after your visit today. Please follow-up with your doctor within 1-2 days for a follow-up check to ensure you are  improving, to see if you need any further evaluation/testing, or to evaluate for any alternate diagnoses.     If you do not have a primary care provider, call [phone] and they will be able to assist you further.     Please return to the emergency department if you develop worsening of your headache or a new headache which is severe, associated with vision changes, difficulty with walking or coordination, difficulty with speech, numbness, tingling or weakness, associated with neck stiffness or fever, nausea or vomiting, if the headache is different from any other headache that you have had before, confusion, or if you develop any other new or concerning symptoms as these could be signs of more serious medical illness.    We hope you feel better.      
Yes

## 2024-12-13 NOTE — PATIENT PROFILE ADULT - FUNCTIONAL ASSESSMENT - BASIC MOBILITY SECTION LABEL
.
Alicia Reddy  Neurosurgery  [address], [address]  [place] [address]  Phone: [phone]  Fax: [phone]  Follow Up Time:

## (undated) DEVICE — POSITIONER FOAM HEAD CRADLE (PINK)

## (undated) DEVICE — DRSG AQUACEL 3.5 X 6"

## (undated) DEVICE — SYR LUER SLIP TIP 30CC

## (undated) DEVICE — SOL IRR POUR H2O 1000ML

## (undated) DEVICE — NDL INJ SCLERO INTERJECT 23G

## (undated) DEVICE — TUBING IV SET GRAVITY 3Y 100" MACRO

## (undated) DEVICE — CATH ELCTR GLIDE PRB 7FR

## (undated) DEVICE — LAP PAD 18 X 18"

## (undated) DEVICE — TUBE O2 SUPL CRUSH RESIS CONN SOUTHSIDE ONLY

## (undated) DEVICE — MASK O2 NON REBREATH 3IN1 ADULT

## (undated) DEVICE — SYR LUER LOK 30CC

## (undated) DEVICE — SOL IRR NS 0.9% 250ML

## (undated) DEVICE — Device

## (undated) DEVICE — DRSG XEROFORM 5 X 9"

## (undated) DEVICE — VENODYNE/SCD SLEEVE CALF MEDIUM

## (undated) DEVICE — CANISTER SUCTION 1200CC 10/SL

## (undated) DEVICE — SOLIDIFIER CANN EXPRESS 3K

## (undated) DEVICE — SENSOR O2 FINGER XL ADULT 24/BX 6BX/CA

## (undated) DEVICE — SNARE LRG

## (undated) DEVICE — SUT POLYSORB 2-0 30" GS-21 UNDYED

## (undated) DEVICE — DRSG AQUACEL 3.5 X 10"

## (undated) DEVICE — BRUSH CYTO ENDO

## (undated) DEVICE — DRSG KLING 3"

## (undated) DEVICE — DRAPE SHOWER CURTAIN ISOLATION

## (undated) DEVICE — MARKER ENDO SPOT EX

## (undated) DEVICE — TRAP QUICK CATCH  SINGL CHAMBER

## (undated) DEVICE — DRSG COMBINE 5X9"

## (undated) DEVICE — TOURNIQUET CUFF 34" SINGLE PORT W PLC (BLACK)

## (undated) DEVICE — GLV 8 PROTEXIS (BLUE)

## (undated) DEVICE — RADIAL JAW 4 LG CAPACITY WITH NDL

## (undated) DEVICE — TUBING ENDO EXT OLYMPUS 160 24HR USE

## (undated) DEVICE — TUBING CANNULA SALTER LABS NASAL ADULT 7FT

## (undated) DEVICE — CATH IV SAFE BC 20G X 1.16" (PINK)

## (undated) DEVICE — DRSG CURITY GAUZE SPONGE 4 X 4" 12-PLY

## (undated) DEVICE — WARMING BLANKET UPPER ADULT

## (undated) DEVICE — TUBING IV SET SECOND 34" W/O LOK-BLUNT

## (undated) DEVICE — PREP CHLORAPREP HI-LITE ORANGE 26ML

## (undated) DEVICE — ELCTR GROUNDING PAD ADULT COVIDIEN

## (undated) DEVICE — DRAPE 3/4 SHEET 52X76"

## (undated) DEVICE — VALVE BIOPSY

## (undated) DEVICE — SET IV PUMP BLOOD 1VALVE 180FILTER NON-DEHP

## (undated) DEVICE — SOL IRR POUR NS 0.9% 1000ML

## (undated) DEVICE — SYR ALLIANCE II INFLATION 60ML

## (undated) DEVICE — SUT POLYSORB 0 30" GS-21 UNDYED

## (undated) DEVICE — SYR IV POSIFLUSH NS 3ML 30/TY

## (undated) DEVICE — FORCEP RADIAL JAW 4 W NDL 2.2MM 2.8MM 160CM YELLOW DISP

## (undated) DEVICE — PACK BASIC TIBURON LTXF STRL

## (undated) DEVICE — DRAPE C ARM UNIVERSAL

## (undated) DEVICE — TUBING SUCTION CONN 6FT STERILE

## (undated) DEVICE — SNARE POLYP SENS 27MM 240CM

## (undated) DEVICE — SUCTION YANKAUER TAPERED BULBOUS NO VENT

## (undated) DEVICE — CATH IV SAFE BC 22G X 1" (BLUE)

## (undated) DEVICE — MASK OXYGEN PANORAMIC

## (undated) DEVICE — PACK IV START WITH CHG

## (undated) DEVICE — SOL IRR BAG H2O 1000ML

## (undated) DEVICE — DRILL BIT SYNTHES ORTHO CANN QC 5X300MM

## (undated) DEVICE — STAPLER SKIN VISI-STAT 35 WIDE

## (undated) DEVICE — STERIS DEFENDO 3-PIECE KIT (AIR/WATER, SUCTION & BIOPSY VALVES)

## (undated) DEVICE — GLV 7.5 PROTEXIS (WHITE)

## (undated) DEVICE — BRUSH COLONOSCOPY CYTOLOGY

## (undated) DEVICE — GLV 8 PROTEXIS (WHITE)

## (undated) DEVICE — CANISTER SUCTION LID GUARD 3000CC

## (undated) DEVICE — DRAPE SPLIT SHEET 77" X 120"

## (undated) DEVICE — BITE BLOCK MAXI RUBBER STAMP

## (undated) DEVICE — CATH ELECHMSTAT  INJ 7FR 210CM

## (undated) DEVICE — DRSG WEBRIL 6"

## (undated) DEVICE — SOL IRR POUR H2O 500ML

## (undated) DEVICE — SUT MONOSOF 3-0 18" C-14

## (undated) DEVICE — TUBING IV EXTENSION MACRO W CLAVE 7"

## (undated) DEVICE — FORMALIN CUPS 10% BUFFERED

## (undated) DEVICE — FOLEY TRAY 16FR SURESTEP LTX BG